# Patient Record
Sex: MALE | URBAN - METROPOLITAN AREA
[De-identification: names, ages, dates, MRNs, and addresses within clinical notes are randomized per-mention and may not be internally consistent; named-entity substitution may affect disease eponyms.]

---

## 2017-10-03 ENCOUNTER — INPATIENT (INPATIENT)
Facility: HOSPITAL | Age: 72
LOS: 2 days | Discharge: HOME | DRG: 871 | End: 2017-10-06
Attending: STUDENT IN AN ORGANIZED HEALTH CARE EDUCATION/TRAINING PROGRAM | Admitting: STUDENT IN AN ORGANIZED HEALTH CARE EDUCATION/TRAINING PROGRAM
Payer: COMMERCIAL

## 2017-10-03 VITALS
OXYGEN SATURATION: 96 % | SYSTOLIC BLOOD PRESSURE: 113 MMHG | TEMPERATURE: 97 F | DIASTOLIC BLOOD PRESSURE: 85 MMHG | HEART RATE: 45 BPM | RESPIRATION RATE: 17 BRPM

## 2017-10-03 DIAGNOSIS — E87.1 HYPO-OSMOLALITY AND HYPONATREMIA: ICD-10-CM

## 2017-10-03 DIAGNOSIS — E87.5 HYPERKALEMIA: ICD-10-CM

## 2017-10-03 DIAGNOSIS — N17.9 ACUTE KIDNEY FAILURE, UNSPECIFIED: ICD-10-CM

## 2017-10-03 DIAGNOSIS — E87.2 ACIDOSIS: ICD-10-CM

## 2017-10-03 LAB
ALBUMIN SERPL ELPH-MCNC: 4 G/DL — SIGNIFICANT CHANGE UP (ref 3.4–5)
ALBUMIN SERPL ELPH-MCNC: 4.3 G/DL — SIGNIFICANT CHANGE UP (ref 3.3–5)
ALP SERPL-CCNC: 86 U/L — SIGNIFICANT CHANGE UP (ref 40–120)
ALP SERPL-CCNC: 86 U/L — SIGNIFICANT CHANGE UP (ref 40–120)
ALT FLD-CCNC: 122 U/L — HIGH (ref 10–45)
ALT FLD-CCNC: 57 U/L — HIGH (ref 12–42)
ANION GAP SERPL CALC-SCNC: 10 MMOL/L — SIGNIFICANT CHANGE UP (ref 9–16)
ANION GAP SERPL CALC-SCNC: 16 MMOL/L — SIGNIFICANT CHANGE UP (ref 5–17)
ANION GAP SERPL CALC-SCNC: 8 MMOL/L — LOW (ref 9–16)
ANION GAP SERPL CALC-SCNC: 9 MMOL/L — SIGNIFICANT CHANGE UP (ref 9–16)
APPEARANCE UR: CLEAR — SIGNIFICANT CHANGE UP
APPEARANCE UR: CLEAR — SIGNIFICANT CHANGE UP
APTT BLD: 25.7 SEC — LOW (ref 27.5–37.4)
APTT BLD: 28.7 SEC — SIGNIFICANT CHANGE UP (ref 27.5–36.5)
AST SERPL-CCNC: 146 U/L — HIGH (ref 10–40)
AST SERPL-CCNC: 55 U/L — HIGH (ref 15–37)
BASOPHILS NFR BLD AUTO: 0.2 % — SIGNIFICANT CHANGE UP (ref 0–2)
BILIRUB SERPL-MCNC: 0.6 MG/DL — SIGNIFICANT CHANGE UP (ref 0.2–1.2)
BILIRUB SERPL-MCNC: 0.7 MG/DL — SIGNIFICANT CHANGE UP (ref 0.2–1.2)
BILIRUB UR-MCNC: (no result)
BILIRUB UR-MCNC: NEGATIVE — SIGNIFICANT CHANGE UP
BLD GP AB SCN SERPL QL: NEGATIVE — SIGNIFICANT CHANGE UP
BUN SERPL-MCNC: 53 MG/DL — HIGH (ref 7–23)
BUN SERPL-MCNC: 55 MG/DL — HIGH (ref 7–23)
BUN SERPL-MCNC: 56 MG/DL — HIGH (ref 7–23)
BUN SERPL-MCNC: 57 MG/DL — HIGH (ref 7–23)
CALCIUM SERPL-MCNC: 8.1 MG/DL — LOW (ref 8.5–10.5)
CALCIUM SERPL-MCNC: 8.5 MG/DL — SIGNIFICANT CHANGE UP (ref 8.5–10.5)
CALCIUM SERPL-MCNC: 8.7 MG/DL — SIGNIFICANT CHANGE UP (ref 8.5–10.5)
CALCIUM SERPL-MCNC: 9.3 MG/DL — SIGNIFICANT CHANGE UP (ref 8.4–10.5)
CHLORIDE SERPL-SCNC: 100 MMOL/L — SIGNIFICANT CHANGE UP (ref 96–108)
CHLORIDE SERPL-SCNC: 94 MMOL/L — LOW (ref 96–108)
CHLORIDE SERPL-SCNC: 98 MMOL/L — SIGNIFICANT CHANGE UP (ref 96–108)
CHLORIDE SERPL-SCNC: 99 MMOL/L — SIGNIFICANT CHANGE UP (ref 96–108)
CHLORIDE UR-SCNC: 31 MMOL/L — SIGNIFICANT CHANGE UP
CK MB BLD-MCNC: 2.8 % — SIGNIFICANT CHANGE UP
CK MB CFR SERPL CALC: 2.6 NG/ML — SIGNIFICANT CHANGE UP (ref 0.5–3.6)
CK MB CFR SERPL CALC: 5.2 NG/ML — SIGNIFICANT CHANGE UP (ref 0–6.7)
CK SERPL-CCNC: 114 U/L — SIGNIFICANT CHANGE UP (ref 30–200)
CO2 SERPL-SCNC: 16 MMOL/L — LOW (ref 22–31)
CO2 SERPL-SCNC: 17 MMOL/L — LOW (ref 22–31)
CO2 SERPL-SCNC: 17 MMOL/L — LOW (ref 22–31)
CO2 SERPL-SCNC: 18 MMOL/L — LOW (ref 22–31)
COLOR SPEC: YELLOW — SIGNIFICANT CHANGE UP
COLOR SPEC: YELLOW — SIGNIFICANT CHANGE UP
CREAT ?TM UR-MCNC: 81 MG/DL — SIGNIFICANT CHANGE UP
CREAT ?TM UR-MCNC: 82 MG/DL — SIGNIFICANT CHANGE UP
CREAT SERPL-MCNC: 2.85 MG/DL — HIGH (ref 0.5–1.3)
CREAT SERPL-MCNC: 3 MG/DL — HIGH (ref 0.5–1.3)
CREAT SERPL-MCNC: 3.03 MG/DL — HIGH (ref 0.5–1.3)
CREAT SERPL-MCNC: 3.05 MG/DL — HIGH (ref 0.5–1.3)
DIFF PNL FLD: NEGATIVE — SIGNIFICANT CHANGE UP
DIFF PNL FLD: NEGATIVE — SIGNIFICANT CHANGE UP
EOSINOPHIL NFR BLD AUTO: 0.1 % — SIGNIFICANT CHANGE UP (ref 0–6)
GAS PNL BLDA: SIGNIFICANT CHANGE UP
GLUCOSE SERPL-MCNC: 174 MG/DL — HIGH (ref 70–99)
GLUCOSE SERPL-MCNC: 214 MG/DL — HIGH (ref 70–99)
GLUCOSE SERPL-MCNC: 228 MG/DL — HIGH (ref 70–99)
GLUCOSE SERPL-MCNC: 311 MG/DL — HIGH (ref 70–99)
GLUCOSE UR QL: NEGATIVE — SIGNIFICANT CHANGE UP
GLUCOSE UR QL: NEGATIVE — SIGNIFICANT CHANGE UP
HCT VFR BLD CALC: 40.1 % — SIGNIFICANT CHANGE UP (ref 39–50)
HCT VFR BLD CALC: 42.1 % — SIGNIFICANT CHANGE UP (ref 39–50)
HGB BLD-MCNC: 13.8 G/DL — SIGNIFICANT CHANGE UP (ref 13–17)
HGB BLD-MCNC: 14.2 G/DL — SIGNIFICANT CHANGE UP (ref 13–17)
IMM GRANULOCYTES NFR BLD AUTO: 0.7 % — SIGNIFICANT CHANGE UP (ref 0–1.5)
INR BLD: 0.96 — SIGNIFICANT CHANGE UP (ref 0.88–1.16)
INR BLD: 0.99 — SIGNIFICANT CHANGE UP (ref 0.88–1.16)
KETONES UR-MCNC: (no result) MG/DL
KETONES UR-MCNC: NEGATIVE — SIGNIFICANT CHANGE UP
LACTATE SERPL-SCNC: 3.4 MMOL/L — HIGH (ref 0.5–2)
LEUKOCYTE ESTERASE UR-ACNC: NEGATIVE — SIGNIFICANT CHANGE UP
LEUKOCYTE ESTERASE UR-ACNC: NEGATIVE — SIGNIFICANT CHANGE UP
LIDOCAIN IGE QN: 430 U/L — HIGH (ref 73–393)
LYMPHOCYTES # BLD AUTO: 9.8 % — LOW (ref 13–44)
MAGNESIUM SERPL-MCNC: 2.3 MG/DL — SIGNIFICANT CHANGE UP (ref 1.6–2.6)
MCHC RBC-ENTMCNC: 31.2 PG — SIGNIFICANT CHANGE UP (ref 27–34)
MCHC RBC-ENTMCNC: 31.3 PG — SIGNIFICANT CHANGE UP (ref 27–34)
MCHC RBC-ENTMCNC: 33.7 G/DL — SIGNIFICANT CHANGE UP (ref 32–36)
MCHC RBC-ENTMCNC: 34.4 G/DL — SIGNIFICANT CHANGE UP (ref 32–36)
MCV RBC AUTO: 90.5 FL — SIGNIFICANT CHANGE UP (ref 80–100)
MCV RBC AUTO: 92.9 FL — SIGNIFICANT CHANGE UP (ref 80–100)
MONOCYTES NFR BLD AUTO: 4.1 % — SIGNIFICANT CHANGE UP (ref 2–14)
NEUTROPHILS NFR BLD AUTO: 85.1 % — HIGH (ref 43–77)
NITRITE UR-MCNC: NEGATIVE — SIGNIFICANT CHANGE UP
NITRITE UR-MCNC: NEGATIVE — SIGNIFICANT CHANGE UP
OSMOLALITY SERPL: 298 MOSM/KG — SIGNIFICANT CHANGE UP (ref 280–301)
OSMOLALITY UR: 348 MOSMOL/KG — SIGNIFICANT CHANGE UP (ref 100–650)
PH UR: 5 — SIGNIFICANT CHANGE UP (ref 5–8)
PH UR: 5.5 — SIGNIFICANT CHANGE UP (ref 5–8)
PHOSPHATE SERPL-MCNC: 4.9 MG/DL — HIGH (ref 2.5–4.5)
PLATELET # BLD AUTO: 171 K/UL — SIGNIFICANT CHANGE UP (ref 150–400)
PLATELET # BLD AUTO: 190 K/UL — SIGNIFICANT CHANGE UP (ref 150–400)
POTASSIUM SERPL-MCNC: 6.8 MMOL/L — CRITICAL HIGH (ref 3.5–5.3)
POTASSIUM SERPL-MCNC: 7.2 MMOL/L — CRITICAL HIGH (ref 3.5–5.3)
POTASSIUM SERPL-MCNC: 8.1 MMOL/L — CRITICAL HIGH (ref 3.5–5.3)
POTASSIUM SERPL-MCNC: 8.3 MMOL/L — CRITICAL HIGH (ref 3.5–5.3)
POTASSIUM SERPL-SCNC: 6.8 MMOL/L — CRITICAL HIGH (ref 3.5–5.3)
POTASSIUM SERPL-SCNC: 7.2 MMOL/L — CRITICAL HIGH (ref 3.5–5.3)
POTASSIUM SERPL-SCNC: 8.1 MMOL/L — CRITICAL HIGH (ref 3.5–5.3)
POTASSIUM SERPL-SCNC: 8.3 MMOL/L — CRITICAL HIGH (ref 3.5–5.3)
POTASSIUM UR-SCNC: 27 MMOL/L — SIGNIFICANT CHANGE UP
PROT ?TM UR-MCNC: 18 MG/DL — HIGH (ref 0–12)
PROT SERPL-MCNC: 7.5 G/DL — SIGNIFICANT CHANGE UP (ref 6–8.3)
PROT SERPL-MCNC: 8.2 G/DL — SIGNIFICANT CHANGE UP (ref 6.4–8.2)
PROT UR-MCNC: 100 MG/DL
PROT UR-MCNC: NEGATIVE MG/DL — SIGNIFICANT CHANGE UP
PROT/CREAT UR-RTO: 0.2 RATIO — SIGNIFICANT CHANGE UP (ref 0–0.2)
PROTHROM AB SERPL-ACNC: 10.5 SEC — SIGNIFICANT CHANGE UP (ref 9.8–12.7)
PROTHROM AB SERPL-ACNC: 11 SEC — SIGNIFICANT CHANGE UP (ref 9.8–12.7)
RBC # BLD: 4.43 M/UL — SIGNIFICANT CHANGE UP (ref 4.2–5.8)
RBC # BLD: 4.53 M/UL — SIGNIFICANT CHANGE UP (ref 4.2–5.8)
RBC # FLD: 14.2 % — SIGNIFICANT CHANGE UP (ref 10.3–16.9)
RBC # FLD: 14.6 % — SIGNIFICANT CHANGE UP (ref 10.3–16.9)
RH IG SCN BLD-IMP: POSITIVE — SIGNIFICANT CHANGE UP
SODIUM SERPL-SCNC: 124 MMOL/L — LOW (ref 132–145)
SODIUM SERPL-SCNC: 125 MMOL/L — LOW (ref 132–145)
SODIUM SERPL-SCNC: 126 MMOL/L — LOW (ref 132–145)
SODIUM SERPL-SCNC: 127 MMOL/L — LOW (ref 135–145)
SODIUM UR-SCNC: 60 MMOL/L — SIGNIFICANT CHANGE UP
SODIUM UR-SCNC: 60 MMOL/L — SIGNIFICANT CHANGE UP
SP GR SPEC: 1.02 — SIGNIFICANT CHANGE UP (ref 1–1.03)
SP GR SPEC: >=1.03 — SIGNIFICANT CHANGE UP (ref 1–1.03)
TROPONIN I SERPL-MCNC: 0.36 NG/ML — HIGH (ref 0.02–0.06)
TROPONIN T SERPL-MCNC: 0.01 NG/ML — SIGNIFICANT CHANGE UP (ref 0–0.01)
UROBILINOGEN FLD QL: 0.2 E.U./DL — SIGNIFICANT CHANGE UP
UROBILINOGEN FLD QL: 0.2 E.U./DL — SIGNIFICANT CHANGE UP
UUN UR-MCNC: 420 MG/DL — SIGNIFICANT CHANGE UP
WBC # BLD: 11.2 K/UL — HIGH (ref 3.8–10.5)
WBC # BLD: 12.3 K/UL — HIGH (ref 3.8–10.5)
WBC # FLD AUTO: 11.2 K/UL — HIGH (ref 3.8–10.5)
WBC # FLD AUTO: 12.3 K/UL — HIGH (ref 3.8–10.5)

## 2017-10-03 PROCEDURE — 93010 ELECTROCARDIOGRAM REPORT: CPT

## 2017-10-03 PROCEDURE — 71010: CPT | Mod: 26

## 2017-10-03 PROCEDURE — 99291 CRITICAL CARE FIRST HOUR: CPT | Mod: 25

## 2017-10-03 RX ORDER — ONDANSETRON 8 MG/1
4 TABLET, FILM COATED ORAL ONCE
Qty: 0 | Refills: 0 | Status: COMPLETED | OUTPATIENT
Start: 2017-10-03 | End: 2017-10-03

## 2017-10-03 RX ORDER — SODIUM CHLORIDE 9 MG/ML
1000 INJECTION INTRAMUSCULAR; INTRAVENOUS; SUBCUTANEOUS ONCE
Qty: 0 | Refills: 0 | Status: COMPLETED | OUTPATIENT
Start: 2017-10-03 | End: 2017-10-03

## 2017-10-03 RX ORDER — GLUCAGON INJECTION, SOLUTION 0.5 MG/.1ML
1 INJECTION, SOLUTION SUBCUTANEOUS ONCE
Qty: 0 | Refills: 0 | Status: DISCONTINUED | OUTPATIENT
Start: 2017-10-03 | End: 2017-10-06

## 2017-10-03 RX ORDER — INSULIN LISPRO 100/ML
VIAL (ML) SUBCUTANEOUS EVERY 4 HOURS
Qty: 0 | Refills: 0 | Status: DISCONTINUED | OUTPATIENT
Start: 2017-10-03 | End: 2017-10-05

## 2017-10-03 RX ORDER — DEXTROSE 50 % IN WATER 50 %
25 SYRINGE (ML) INTRAVENOUS ONCE
Qty: 0 | Refills: 0 | Status: COMPLETED | OUTPATIENT
Start: 2017-10-03 | End: 2017-10-03

## 2017-10-03 RX ORDER — CALCIUM GLUCONATE 100 MG/ML
2 VIAL (ML) INTRAVENOUS ONCE
Qty: 0 | Refills: 0 | Status: COMPLETED | OUTPATIENT
Start: 2017-10-03 | End: 2017-10-03

## 2017-10-03 RX ORDER — SODIUM POLYSTYRENE SULFONATE 4.1 MEQ/G
30 POWDER, FOR SUSPENSION ORAL ONCE
Qty: 0 | Refills: 0 | Status: DISCONTINUED | OUTPATIENT
Start: 2017-10-03 | End: 2017-10-03

## 2017-10-03 RX ORDER — INSULIN HUMAN 100 [IU]/ML
10 INJECTION, SOLUTION SUBCUTANEOUS ONCE
Qty: 0 | Refills: 0 | Status: COMPLETED | OUTPATIENT
Start: 2017-10-03 | End: 2017-10-03

## 2017-10-03 RX ORDER — DEXTROSE 50 % IN WATER 50 %
25 SYRINGE (ML) INTRAVENOUS ONCE
Qty: 0 | Refills: 0 | Status: DISCONTINUED | OUTPATIENT
Start: 2017-10-03 | End: 2017-10-06

## 2017-10-03 RX ORDER — SODIUM POLYSTYRENE SULFONATE 4.1 MEQ/G
15 POWDER, FOR SUSPENSION ORAL ONCE
Qty: 0 | Refills: 0 | Status: COMPLETED | OUTPATIENT
Start: 2017-10-03 | End: 2017-10-03

## 2017-10-03 RX ORDER — SODIUM BICARBONATE 1 MEQ/ML
50 SYRINGE (ML) INTRAVENOUS ONCE
Qty: 0 | Refills: 0 | Status: COMPLETED | OUTPATIENT
Start: 2017-10-03 | End: 2017-10-03

## 2017-10-03 RX ORDER — CALCIUM GLUCONATE 100 MG/ML
1 VIAL (ML) INTRAVENOUS ONCE
Qty: 0 | Refills: 0 | Status: COMPLETED | OUTPATIENT
Start: 2017-10-03 | End: 2017-10-03

## 2017-10-03 RX ORDER — DEXTROSE 50 % IN WATER 50 %
1 SYRINGE (ML) INTRAVENOUS ONCE
Qty: 0 | Refills: 0 | Status: DISCONTINUED | OUTPATIENT
Start: 2017-10-03 | End: 2017-10-06

## 2017-10-03 RX ORDER — SODIUM CHLORIDE 9 MG/ML
1000 INJECTION, SOLUTION INTRAVENOUS
Qty: 0 | Refills: 0 | Status: DISCONTINUED | OUTPATIENT
Start: 2017-10-03 | End: 2017-10-06

## 2017-10-03 RX ORDER — DEXTROSE 50 % IN WATER 50 %
12.5 SYRINGE (ML) INTRAVENOUS ONCE
Qty: 0 | Refills: 0 | Status: DISCONTINUED | OUTPATIENT
Start: 2017-10-03 | End: 2017-10-06

## 2017-10-03 RX ORDER — SODIUM CHLORIDE 9 MG/ML
1000 INJECTION, SOLUTION INTRAVENOUS
Qty: 0 | Refills: 0 | Status: DISCONTINUED | OUTPATIENT
Start: 2017-10-03 | End: 2017-10-04

## 2017-10-03 RX ORDER — ALBUTEROL 90 UG/1
2.5 AEROSOL, METERED ORAL ONCE
Qty: 0 | Refills: 0 | Status: COMPLETED | OUTPATIENT
Start: 2017-10-03 | End: 2017-10-03

## 2017-10-03 RX ORDER — DEXTROSE 50 % IN WATER 50 %
50 SYRINGE (ML) INTRAVENOUS ONCE
Qty: 0 | Refills: 0 | Status: COMPLETED | OUTPATIENT
Start: 2017-10-03 | End: 2017-10-03

## 2017-10-03 RX ADMIN — Medication 50 MILLIEQUIVALENT(S): at 16:46

## 2017-10-03 RX ADMIN — Medication 50 MILLILITER(S): at 16:46

## 2017-10-03 RX ADMIN — Medication 25 MILLILITER(S): at 23:00

## 2017-10-03 RX ADMIN — SODIUM CHLORIDE 4000 MILLILITER(S): 9 INJECTION INTRAMUSCULAR; INTRAVENOUS; SUBCUTANEOUS at 23:00

## 2017-10-03 RX ADMIN — Medication 400 GRAM(S): at 16:47

## 2017-10-03 RX ADMIN — ALBUTEROL 2.5 MILLIGRAM(S): 90 AEROSOL, METERED ORAL at 16:27

## 2017-10-03 RX ADMIN — INSULIN HUMAN 10 UNIT(S): 100 INJECTION, SOLUTION SUBCUTANEOUS at 16:45

## 2017-10-03 RX ADMIN — ONDANSETRON 4 MILLIGRAM(S): 8 TABLET, FILM COATED ORAL at 14:39

## 2017-10-03 RX ADMIN — Medication 200 GRAM(S): at 23:00

## 2017-10-03 RX ADMIN — SODIUM POLYSTYRENE SULFONATE 15 GRAM(S): 4.1 POWDER, FOR SUSPENSION ORAL at 22:55

## 2017-10-03 RX ADMIN — SODIUM POLYSTYRENE SULFONATE 15 GRAM(S): 4.1 POWDER, FOR SUSPENSION ORAL at 16:27

## 2017-10-03 RX ADMIN — SODIUM CHLORIDE 1000 MILLILITER(S): 9 INJECTION INTRAMUSCULAR; INTRAVENOUS; SUBCUTANEOUS at 14:39

## 2017-10-03 RX ADMIN — INSULIN HUMAN 10 UNIT(S): 100 INJECTION, SOLUTION SUBCUTANEOUS at 23:00

## 2017-10-03 NOTE — CONSULT NOTE ADULT - ATTENDING COMMENTS
Patient presented with hyperkalemia and elevated creatinine. Only history of significance is remote history of AICD placed for ASHD.  Patient is unable to give additional history (intubated).  Potassium to be treated with hydration, morales drainage of urine, diuretics, and kayexalate.  With hydration it is anticipated that patient will excrete K.  Creatinine is expected to improve with hydration.   Patient to be monitored closely; if renal function further deteriorates or hyperkalemia is not controlled will arrange for dialysis.

## 2017-10-03 NOTE — CONSULT NOTE ADULT - PROBLEM SELECTOR RECOMMENDATION 4
Patients hyponatremia could be due to hyperosmolar hyponatremia     P - Please check urine sodium and urine osmolality   Monitor labs q4h   Goal corrected sodium at 135

## 2017-10-03 NOTE — CONSULT NOTE ADULT - SUBJECTIVE AND OBJECTIVE BOX
Patient is a 72y Male for whom nephrology was consulted for renal failure and hyperkalemia. Patient presented to the hospital with complaints of lower abdominal pain and dizziness. Patient stated the discomfort started around 7 am. Patient complained of nausea and lightheadedness. Patient denied any fevers, chills, diarrhea, vomiting, or cough. Patient was noted to have an elevated creatinine of 3.0 and hyperkalemic to 8.1 and with non-anion gap metabolic acidosis.     PAST MEDICAL & SURGICAL HISTORY:  Diabetes mellitus  Coronary artery disease  Congestive heart failure s/p pacemaker     MEDICATIONS  (STANDING):  dextrose 5%. 1000 milliLiter(s) (50 mL/Hr) IV Continuous <Continuous>  dextrose 50% Injectable 12.5 Gram(s) IV Push once  dextrose 50% Injectable 25 Gram(s) IV Push once  dextrose 50% Injectable 25 Gram(s) IV Push once  insulin lispro (HumaLOG) corrective regimen sliding scale   SubCutaneous every 4 hours    MEDICATIONS  (PRN):  dextrose Gel 1 Dose(s) Oral once PRN Blood Glucose LESS THAN 70 milliGRAM(s)/deciliter  glucagon  Injectable 1 milliGRAM(s) IntraMuscular once PRN Glucose LESS THAN 70 milligrams/deciliter    Allergies    No Known Allergies    Intolerances    SOCIAL HISTORY:    FAMILY HISTORY:    T(C): , Max: 37.2 (10-03-17 @ 20:02)  T(F): , Max: 98.9 (10-03-17 @ 20:02)  HR: 51 (10-03-17 @ 20:02)  BP: 141/87 (10-03-17 @ 20:02)  BP(mean): 119 (10-03-17 @ 20:02)  RR: 22 (10-03-17 @ 20:02)  SpO2: 98% (10-03-17 @ 20:02)    LABS:                        14.2   12.3  )-----------( 190      ( 03 Oct 2017 14:46 )             42.1     10-03    126<L>  |  100  |  57<H>  ----------------------------<  311<H>  7.2<HH>   |  16<L>  |  3.00<H>    Ca    8.5      03 Oct 2017 18:05    TPro  8.2  /  Alb  4.0  /  TBili  0.7  /  DBili  x   /  AST  55<H>  /  ALT  57<H>  /  AlkPhos  86  10-03    Creatine Kinase, Serum: 93 U/L [39 - 308] (10-03 @ 14:46)    PT/INR - ( 03 Oct 2017 14:46 )   PT: 10.5 sec;   INR: 0.96          PTT - ( 03 Oct 2017 14:46 )  PTT:28.7 sec  Urinalysis Basic - ( 03 Oct 2017 15:52 )    Color: Yellow / Appearance: Clear / SG: >=1.030 / pH: x  Gluc: x / Ketone: Trace mg/dL  / Bili: Small / Urobili: 0.2 E.U./dL   Blood: x / Protein: 100 mg/dL / Nitrite: NEGATIVE   Leuk Esterase: NEGATIVE / RBC: < 5 /HPF / WBC < 5 /HPF   Sq Epi: x / Non Sq Epi: Rare /HPF / Bacteria: Many /HPF Patient is a 72y Male for whom nephrology was consulted for renal failure and hyperkalemia. Patient presented to the hospital with complaints of lower abdominal pain and dizziness. Patient stated the discomfort started around 7 am. Patient complained of nausea and lightheadedness. Patient states that he was [place] way to the airport when he started to feel weak and decided to go to the hospital.  Patient denied any fevers, chills, diarrhea, vomiting, or cough. Patient was noted to have an elevated creatinine of 3.0 and hyperkalemic to 8.1 and with non-anion gap metabolic acidosis.     PAST MEDICAL & SURGICAL HISTORY:  Diabetes mellitus  Coronary artery disease  Congestive heart failure s/p pacemaker     MEDICATIONS  (STANDING):  dextrose 5%. 1000 milliLiter(s) (50 mL/Hr) IV Continuous <Continuous>  dextrose 50% Injectable 12.5 Gram(s) IV Push once  dextrose 50% Injectable 25 Gram(s) IV Push once  dextrose 50% Injectable 25 Gram(s) IV Push once  insulin lispro (HumaLOG) corrective regimen sliding scale   SubCutaneous every 4 hours    MEDICATIONS  (PRN):  dextrose Gel 1 Dose(s) Oral once PRN Blood Glucose LESS THAN 70 milliGRAM(s)/deciliter  glucagon  Injectable 1 milliGRAM(s) IntraMuscular once PRN Glucose LESS THAN 70 milligrams/deciliter    Allergies    No Known Allergies    Intolerances    SOCIAL HISTORY:    FAMILY HISTORY:    T(C): , Max: 37.2 (10-03-17 @ 20:02)  T(F): , Max: 98.9 (10-03-17 @ 20:02)  HR: 51 (10-03-17 @ 20:02)  BP: 141/87 (10-03-17 @ 20:02)  BP(mean): 119 (10-03-17 @ 20:02)  RR: 22 (10-03-17 @ 20:02)  SpO2: 98% (10-03-17 @ 20:02)    LABS:                        14.2   12.3  )-----------( 190      ( 03 Oct 2017 14:46 )             42.1     10-03    126<L>  |  100  |  57<H>  ----------------------------<  311<H>  7.2<HH>   |  16<L>  |  3.00<H>    Ca    8.5      03 Oct 2017 18:05    TPro  8.2  /  Alb  4.0  /  TBili  0.7  /  DBili  x   /  AST  55<H>  /  ALT  57<H>  /  AlkPhos  86  10-03    Creatine Kinase, Serum: 93 U/L [39 - 308] (10-03 @ 14:46)    PT/INR - ( 03 Oct 2017 14:46 )   PT: 10.5 sec;   INR: 0.96          PTT - ( 03 Oct 2017 14:46 )  PTT:28.7 sec  Urinalysis Basic - ( 03 Oct 2017 15:52 )    Color: Yellow / Appearance: Clear / SG: >=1.030 / pH: x  Gluc: x / Ketone: Trace mg/dL  / Bili: Small / Urobili: 0.2 E.U./dL   Blood: x / Protein: 100 mg/dL / Nitrite: NEGATIVE   Leuk Esterase: NEGATIVE / RBC: < 5 /HPF / WBC < 5 /HPF   Sq Epi: x / Non Sq Epi: Rare /HPF / Bacteria: Many /HPF

## 2017-10-03 NOTE — ED PROVIDER NOTE - MEDICAL DECISION MAKING DETAILS
Pt with vague complaints.  Abd exam benign.  Initially ordered CTAp to look for source of sxs but pt declined.  Labs returned and pt in severe ARF with hyperkalemia.  BMP repeated to confirm abnormal value.  Meds given to bring down K+ brought down somewhat but pt will likely need emergent HD.  MICU consulted at [facility] and accept pt for admission.  Renal fellow called at 634pm to be made aware of case.

## 2017-10-03 NOTE — CONSULT NOTE ADULT - PROBLEM SELECTOR RECOMMENDATION 9
-#12 [place] morales placed for strict I's and O's  -f/u u/a  -f/u retrop US  -f/u BMP's
Patient is a 72 year old male with renal failure with unknown baseline. Renal failure could be due to prerenal azotemia. Acute GN is a less likely possibility as it does not fit with the clinical picture. It could be due to obstructive nephropathy. This could also just be chronic kidney disease.     P - would recommend to start patient on D5W with 150 mEq of sodium bicarbonate at 75 cc/hr   Agree with giving 1L fluid challenge   Please send UA, urine lytes, urine protein/creatinine ratio   Please check a retroperitoneal ultrasound to rule out obstruction, evaluate the size and echogenicity of the kidneys to evaluate chronicity of the renal failure   Please monitor strict I/Os   no need for emergent dialysis at this time as patient seems to be responding to medical therapy   However, it has been discussed with the patient that if his electrolytes worsen and should he deteriorate he may require hemodialysis. He agrees to dialysis if it is necessary   Please avoid nephrotoxic medications, IV contrast, and NSAIDs

## 2017-10-03 NOTE — ED PROVIDER NOTE - CARE PLAN
Principal Discharge DX:	Acute renal failure, unspecified acute renal failure type  Secondary Diagnosis:	Hyperkalemia

## 2017-10-03 NOTE — CONSULT NOTE ADULT - PROBLEM SELECTOR RECOMMENDATION 2
Patient's hyperkalemia is likely related to renal failure along with the use of ARBs. Patient is responding to medical therapy and potassium has improved from 8.1 to 6.8.     P - would continue with medical management   Start sodium bicarbonate drip at 75 cc/hr   please give kayexelate 15 gm PO   please give insulin and dextrose   repeat EKG and if there are changes, please give calcium gluconate  repeat labs q6h   No need for emergent dialysis at this time as patient is responding to medical therapy

## 2017-10-03 NOTE — CONSULT NOTE ADULT - ASSESSMENT
71 yo male with ARF, hyperkalemia; difficult morales placement
Patient is a 72 year old male with a history of coronary artery disease, hypertension, and diabetes mellitus whom prseented to the hospital with renal failure and hyperkalemia.

## 2017-10-03 NOTE — CONSULT NOTE ADULT - PROBLEM SELECTOR RECOMMENDATION 3
Patient has non-anion gap metabolic acidosis in the setting of renal failure.   Please start bicarbonate drip at 75 cc/hr   Monitor electrolytes

## 2017-10-03 NOTE — ED ADULT NURSE NOTE - OBJECTIVE STATEMENT
Patient is a 73 y/o male presents to the ED s/p dizziness. Patient reports feeling BP drop, slight generalized abdominal discomfort, and bloating. Patient denies fever/chills, weakness, urinary/bowel s/s, N/V/D/C. Patient in NAD, patient clinically upgraded, and will continue to monitor. Patient has pacemaker to left chest.

## 2017-10-03 NOTE — ED PROVIDER NOTE - OBJECTIVE STATEMENT
73 y/o Male with a hx of DM and has a defibrillator/pacemaker BIBA for lower abdominal pain. Pt states nausea and pain started today at 7am. Denies CP, dizziness, fever, chills, diarrhea, vomiting, and cough. Came from [place] on 9/29/17 and will return on 10/4/17. 73 y/o Male with a hx of DM, CAD, CHF, (has defibrillator/pacemaker) BIBA for lower abdominal pain and dizziness. Pt states nausea and discomfort started today at 7am. Associated with light headed sensation and generalized weakness/fatigue.  Denies CP, fever, chills, diarrhea, vomiting, and cough. Came from [place] on 9/29/17 and will return on 10/4/17.    Patient interviewed with [place] 71 y/o Male with a hx of DM, CAD, CHF, (has defibrillator/pacemaker) BIBA for lower abdominal pain and dizziness. Pt states nausea and discomfort started today at 7am. Associated with light headed sensation and generalized weakness/fatigue.  Denies CP, fever, chills, diarrhea, vomiting, and cough. Came from [place] on 9/29/17 and will return on 10/4/17.  Pt reports he wants to have it "checked out" before departing on a long plane flight.      Patient interviewed with Spanish .

## 2017-10-03 NOTE — CONSULT NOTE ADULT - PROBLEM SELECTOR PROBLEM 1
Acute renal failure, unspecified acute renal failure type
Acute renal failure, unspecified acute renal failure type

## 2017-10-03 NOTE — CONSULT NOTE ADULT - RS GEN PE MLT RESP DETAILS PC
normal/good air movement/airway patent/clear to auscultation bilaterally/respirations non-labored/breath sounds equal

## 2017-10-03 NOTE — ED PROVIDER NOTE - CRITICAL CARE PROVIDED
additional history taking/consultation with other physicians/documentation/direct patient care (not related to procedure)/interpretation of diagnostic studies

## 2017-10-03 NOTE — CONSULT NOTE ADULT - SUBJECTIVE AND OBJECTIVE BOX
Patient is a 72y Male with h/o DM, CAD, htn, chf, admitted  for renal failure and hyperkalemia. Patient presented to the hospital with complaints of lower abdominal pain and dizziness. Patient stated the discomfort started around 7 am. Patient complained of nausea and lightheadedness. Patient denied any fevers, chills, diarrhea, vomiting, or cough. Patient was noted to have an elevated creatinine of 3.0 and hyperkalemic to 8.1 and with non-anion gap metabolic acidosis. Difficulty placing morales. Called for evaluation. Patient denies any urinary issues in past. No dysuria/hematuria. No frequency, urgency.       PAST MEDICAL & SURGICAL HISTORY: dm, cad, chf, ppm      MEDICATIONS  (STANDING):  dextrose 5%. 1000 milliLiter(s) (50 mL/Hr) IV Continuous <Continuous>  dextrose 50% Injectable 12.5 Gram(s) IV Push once  dextrose 50% Injectable 25 Gram(s) IV Push once  dextrose 50% Injectable 25 Gram(s) IV Push once  insulin lispro (HumaLOG) corrective regimen sliding scale   SubCutaneous every 4 hours    MEDICATIONS  (PRN):  dextrose Gel 1 Dose(s) Oral once PRN Blood Glucose LESS THAN 70 milliGRAM(s)/deciliter  glucagon  Injectable 1 milliGRAM(s) IntraMuscular once PRN Glucose LESS THAN 70 milligrams/deciliter      Allergies    No Known Allergies    Intolerances        SOCIAL HISTORY:    FAMILY HISTORY:      Vital Signs Last 24 Hrs  T(C): 37.2 (03 Oct 2017 20:02), Max: 37.2 (03 Oct 2017 20:02)  T(F): 98.9 (03 Oct 2017 20:02), Max: 98.9 (03 Oct 2017 20:02)  HR: 44 (03 Oct 2017 21:00) (44 - 60)  BP: 145/89 (03 Oct 2017 21:00) (113/85 - 175/93)  BP(mean): 111 (03 Oct 2017 21:00) (111 - 119)  RR: 23 (03 Oct 2017 21:00) (16 - 23)  SpO2: 100% (03 Oct 2017 21:00) (95% - 100%)    On PE:  General: alert and awake  Abdomen: soft, NT, ND  : no masses palpable, testes descended bilat  EXT: no edema    LABS:                        13.8   11.2  )-----------( 171      ( 03 Oct 2017 20:58 )             40.1     10-03    127<L>  |  94<L>  |  53<H>  ----------------------------<  174<H>  6.8<HH>   |  17<L>  |  2.85<H>    Ca    9.3      03 Oct 2017 20:58  Phos  4.9     10-03  Mg     2.3     10-03    TPro  7.5  /  Alb  4.3  /  TBili  0.6  /  DBili  x   /  AST  146<H>  /  ALT  122<H>  /  AlkPhos  86  10-03    PT/INR - ( 03 Oct 2017 20:58 )   PT: 11.0 sec;   INR: 0.99          PTT - ( 03 Oct 2017 20:58 )  PTT:25.7 sec  Urinalysis Basic - ( 03 Oct 2017 15:52 )    Color: Yellow / Appearance: Clear / SG: >=1.030 / pH: x  Gluc: x / Ketone: Trace mg/dL  / Bili: Small / Urobili: 0.2 E.U./dL   Blood: x / Protein: 100 mg/dL / Nitrite: NEGATIVE   Leuk Esterase: NEGATIVE / RBC: < 5 /HPF / WBC < 5 /HPF   Sq Epi: x / Non Sq Epi: Rare /HPF / Bacteria: Many /HPF        RADIOLOGY & ADDITIONAL STUDIES:

## 2017-10-03 NOTE — ED ADULT NURSE REASSESSMENT NOTE - NS ED NURSE REASSESS COMMENT FT1
Tried to place morales catheter. Unsuccessful. When trying to advance catheter, felt blockage and patient was experiencing too much pain. MD Martínez notified, was told he did not want treatment delayed for patient. MEGHA Fagan from [facility] notified of unsuccessful morales insertion. Patient in NAD and will continue to monitor.

## 2017-10-03 NOTE — ED PROVIDER NOTE - PROGRESS NOTE DETAILS
Used  Alfreda [id number], informed patient of potasium finding of 8.1 and advised him to be admitted if repeat blood work continues to show abnormalities. Pt kept saying "I'm fine, I just want medication to bring my BP back up to normal, [facility] [place] [facility] Pt is also declining the CAT scan of the abdomen. Pt also reports to feeling better after zofran Used : [id number], informed patient of new lab findings and explained extensively about dangerous findings. Pt agreed to stay and be admitted. Will call transfer center.

## 2017-10-04 LAB
ALBUMIN SERPL ELPH-MCNC: 3.2 G/DL — LOW (ref 3.3–5)
ALBUMIN SERPL ELPH-MCNC: 3.4 G/DL — SIGNIFICANT CHANGE UP (ref 3.3–5)
ALBUMIN SERPL ELPH-MCNC: 4.1 G/DL — SIGNIFICANT CHANGE UP (ref 3.3–5)
ALP SERPL-CCNC: 71 U/L — SIGNIFICANT CHANGE UP (ref 40–120)
ALP SERPL-CCNC: 76 U/L — SIGNIFICANT CHANGE UP (ref 40–120)
ALP SERPL-CCNC: 83 U/L — SIGNIFICANT CHANGE UP (ref 40–120)
ALT FLD-CCNC: 114 U/L — HIGH (ref 10–45)
ALT FLD-CCNC: 76 U/L — HIGH (ref 10–45)
ALT FLD-CCNC: 89 U/L — HIGH (ref 10–45)
ANION GAP SERPL CALC-SCNC: 13 MMOL/L — SIGNIFICANT CHANGE UP (ref 5–17)
ANION GAP SERPL CALC-SCNC: 13 MMOL/L — SIGNIFICANT CHANGE UP (ref 5–17)
ANION GAP SERPL CALC-SCNC: 18 MMOL/L — HIGH (ref 5–17)
ANION GAP SERPL CALC-SCNC: 18 MMOL/L — HIGH (ref 5–17)
AST SERPL-CCNC: 110 U/L — HIGH (ref 10–40)
AST SERPL-CCNC: 45 U/L — HIGH (ref 10–40)
AST SERPL-CCNC: 70 U/L — HIGH (ref 10–40)
BASE EXCESS BLDA CALC-SCNC: -2.2 MMOL/L — LOW (ref -2–3)
BASE EXCESS BLDA CALC-SCNC: 0.8 MMOL/L — SIGNIFICANT CHANGE UP (ref -2–3)
BILIRUB SERPL-MCNC: 0.4 MG/DL — SIGNIFICANT CHANGE UP (ref 0.2–1.2)
BILIRUB SERPL-MCNC: 0.4 MG/DL — SIGNIFICANT CHANGE UP (ref 0.2–1.2)
BILIRUB SERPL-MCNC: 0.6 MG/DL — SIGNIFICANT CHANGE UP (ref 0.2–1.2)
BUN SERPL-MCNC: 42 MG/DL — HIGH (ref 7–23)
BUN SERPL-MCNC: 46 MG/DL — HIGH (ref 7–23)
BUN SERPL-MCNC: 50 MG/DL — HIGH (ref 7–23)
BUN SERPL-MCNC: 51 MG/DL — HIGH (ref 7–23)
CALCIUM SERPL-MCNC: 7.9 MG/DL — LOW (ref 8.4–10.5)
CALCIUM SERPL-MCNC: 8.2 MG/DL — LOW (ref 8.4–10.5)
CALCIUM SERPL-MCNC: 8.6 MG/DL — SIGNIFICANT CHANGE UP (ref 8.4–10.5)
CALCIUM SERPL-MCNC: 9.3 MG/DL — SIGNIFICANT CHANGE UP (ref 8.4–10.5)
CHLORIDE SERPL-SCNC: 94 MMOL/L — LOW (ref 96–108)
CHLORIDE SERPL-SCNC: 94 MMOL/L — LOW (ref 96–108)
CHLORIDE SERPL-SCNC: 95 MMOL/L — LOW (ref 96–108)
CHLORIDE SERPL-SCNC: 95 MMOL/L — LOW (ref 96–108)
CK MB CFR SERPL CALC: 5.3 NG/ML — SIGNIFICANT CHANGE UP (ref 0–6.7)
CK SERPL-CCNC: 127 U/L — SIGNIFICANT CHANGE UP (ref 30–200)
CO2 SERPL-SCNC: 15 MMOL/L — LOW (ref 22–31)
CO2 SERPL-SCNC: 17 MMOL/L — LOW (ref 22–31)
CO2 SERPL-SCNC: 22 MMOL/L — SIGNIFICANT CHANGE UP (ref 22–31)
CO2 SERPL-SCNC: 25 MMOL/L — SIGNIFICANT CHANGE UP (ref 22–31)
CREAT SERPL-MCNC: 2.2 MG/DL — HIGH (ref 0.5–1.3)
CREAT SERPL-MCNC: 2.29 MG/DL — HIGH (ref 0.5–1.3)
CREAT SERPL-MCNC: 2.49 MG/DL — HIGH (ref 0.5–1.3)
CREAT SERPL-MCNC: 2.69 MG/DL — HIGH (ref 0.5–1.3)
GAS PNL BLDA: SIGNIFICANT CHANGE UP
GAS PNL BLDV: SIGNIFICANT CHANGE UP
GGT SERPL-CCNC: 284 U/L — HIGH (ref 9–50)
GLUCOSE SERPL-MCNC: 174 MG/DL — HIGH (ref 70–99)
GLUCOSE SERPL-MCNC: 181 MG/DL — HIGH (ref 70–99)
GLUCOSE SERPL-MCNC: 214 MG/DL — HIGH (ref 70–99)
GLUCOSE SERPL-MCNC: 228 MG/DL — HIGH (ref 70–99)
HBA1C BLD-MCNC: 7 % — HIGH (ref 4–5.6)
HCO3 BLDA-SCNC: 22 MMOL/L — SIGNIFICANT CHANGE UP (ref 21–28)
HCO3 BLDA-SCNC: 25 MMOL/L — SIGNIFICANT CHANGE UP (ref 21–28)
HCT VFR BLD CALC: 41.4 % — SIGNIFICANT CHANGE UP (ref 39–50)
HGB BLD-MCNC: 14.4 G/DL — SIGNIFICANT CHANGE UP (ref 13–17)
LACTATE SERPL-SCNC: 2 MMOL/L — SIGNIFICANT CHANGE UP (ref 0.5–2)
LACTATE SERPL-SCNC: 2.3 MMOL/L — HIGH (ref 0.5–2)
LACTATE SERPL-SCNC: 2.9 MMOL/L — HIGH (ref 0.5–2)
MAGNESIUM SERPL-MCNC: 2 MG/DL — SIGNIFICANT CHANGE UP (ref 1.6–2.6)
MAGNESIUM SERPL-MCNC: 2.2 MG/DL — SIGNIFICANT CHANGE UP (ref 1.6–2.6)
MCHC RBC-ENTMCNC: 31.6 PG — SIGNIFICANT CHANGE UP (ref 27–34)
MCHC RBC-ENTMCNC: 34.8 G/DL — SIGNIFICANT CHANGE UP (ref 32–36)
MCV RBC AUTO: 90.8 FL — SIGNIFICANT CHANGE UP (ref 80–100)
NT-PROBNP SERPL-SCNC: 956 PG/ML — HIGH (ref 0–300)
PCO2 BLDA: 34 MMHG — LOW (ref 35–48)
PCO2 BLDA: 37 MMHG — SIGNIFICANT CHANGE UP (ref 35–48)
PH BLDA: 7.42 — SIGNIFICANT CHANGE UP (ref 7.35–7.45)
PH BLDA: 7.44 — SIGNIFICANT CHANGE UP (ref 7.35–7.45)
PHOSPHATE SERPL-MCNC: 4.2 MG/DL — SIGNIFICANT CHANGE UP (ref 2.5–4.5)
PHOSPHATE SERPL-MCNC: 4.5 MG/DL — SIGNIFICANT CHANGE UP (ref 2.5–4.5)
PLATELET # BLD AUTO: 172 K/UL — SIGNIFICANT CHANGE UP (ref 150–400)
PO2 BLDA: 102 MMHG — SIGNIFICANT CHANGE UP (ref 83–108)
PO2 BLDA: 87 MMHG — SIGNIFICANT CHANGE UP (ref 83–108)
POTASSIUM SERPL-MCNC: 4.3 MMOL/L — SIGNIFICANT CHANGE UP (ref 3.5–5.3)
POTASSIUM SERPL-MCNC: 4.4 MMOL/L — SIGNIFICANT CHANGE UP (ref 3.5–5.3)
POTASSIUM SERPL-MCNC: 6.3 MMOL/L — CRITICAL HIGH (ref 3.5–5.3)
POTASSIUM SERPL-MCNC: 6.6 MMOL/L — CRITICAL HIGH (ref 3.5–5.3)
POTASSIUM SERPL-SCNC: 4.3 MMOL/L — SIGNIFICANT CHANGE UP (ref 3.5–5.3)
POTASSIUM SERPL-SCNC: 4.4 MMOL/L — SIGNIFICANT CHANGE UP (ref 3.5–5.3)
POTASSIUM SERPL-SCNC: 6.3 MMOL/L — CRITICAL HIGH (ref 3.5–5.3)
POTASSIUM SERPL-SCNC: 6.6 MMOL/L — CRITICAL HIGH (ref 3.5–5.3)
PROT SERPL-MCNC: 5.9 G/DL — LOW (ref 6–8.3)
PROT SERPL-MCNC: 6.3 G/DL — SIGNIFICANT CHANGE UP (ref 6–8.3)
PROT SERPL-MCNC: 7.2 G/DL — SIGNIFICANT CHANGE UP (ref 6–8.3)
RBC # BLD: 4.56 M/UL — SIGNIFICANT CHANGE UP (ref 4.2–5.8)
RBC # FLD: 14.7 % — SIGNIFICANT CHANGE UP (ref 10.3–16.9)
SAO2 % BLDA: 97 % — SIGNIFICANT CHANGE UP (ref 95–100)
SAO2 % BLDA: 98 % — SIGNIFICANT CHANGE UP (ref 95–100)
SODIUM SERPL-SCNC: 127 MMOL/L — LOW (ref 135–145)
SODIUM SERPL-SCNC: 129 MMOL/L — LOW (ref 135–145)
SODIUM SERPL-SCNC: 130 MMOL/L — LOW (ref 135–145)
SODIUM SERPL-SCNC: 133 MMOL/L — LOW (ref 135–145)
TROPONIN T SERPL-MCNC: 0.02 NG/ML — HIGH (ref 0–0.01)
URATE SERPL-MCNC: 5.8 MG/DL — SIGNIFICANT CHANGE UP (ref 3.4–8.8)
WBC # BLD: 11.6 K/UL — HIGH (ref 3.8–10.5)
WBC # FLD AUTO: 11.6 K/UL — HIGH (ref 3.8–10.5)

## 2017-10-04 PROCEDURE — 99233 SBSQ HOSP IP/OBS HIGH 50: CPT | Mod: GC

## 2017-10-04 PROCEDURE — 74000: CPT | Mod: 26

## 2017-10-04 PROCEDURE — 74176 CT ABD & PELVIS W/O CONTRAST: CPT | Mod: 26

## 2017-10-04 PROCEDURE — 71250 CT THORAX DX C-: CPT | Mod: 26

## 2017-10-04 PROCEDURE — 36620 INSERTION CATHETER ARTERY: CPT | Mod: GC

## 2017-10-04 PROCEDURE — 99291 CRITICAL CARE FIRST HOUR: CPT | Mod: 25

## 2017-10-04 PROCEDURE — 93306 TTE W/DOPPLER COMPLETE: CPT | Mod: 26

## 2017-10-04 PROCEDURE — 71010: CPT | Mod: 26

## 2017-10-04 RX ORDER — AZITHROMYCIN 500 MG/1
500 TABLET, FILM COATED ORAL EVERY 24 HOURS
Qty: 0 | Refills: 0 | Status: DISCONTINUED | OUTPATIENT
Start: 2017-10-04 | End: 2017-10-04

## 2017-10-04 RX ORDER — DEXCHLORPHENIRAMINE MALEATE 6 MG
0 TABLET, EXTENDED RELEASE ORAL
Qty: 0 | Refills: 0 | COMMUNITY

## 2017-10-04 RX ORDER — VANCOMYCIN HCL 1 G
1000 VIAL (EA) INTRAVENOUS ONCE
Qty: 0 | Refills: 0 | Status: COMPLETED | OUTPATIENT
Start: 2017-10-04 | End: 2017-10-04

## 2017-10-04 RX ORDER — IPRATROPIUM/ALBUTEROL SULFATE 18-103MCG
3 AEROSOL WITH ADAPTER (GRAM) INHALATION EVERY 4 HOURS
Qty: 0 | Refills: 0 | Status: DISCONTINUED | OUTPATIENT
Start: 2017-10-04 | End: 2017-10-06

## 2017-10-04 RX ORDER — PROPOFOL 10 MG/ML
5 INJECTION, EMULSION INTRAVENOUS
Qty: 1000 | Refills: 0 | Status: DISCONTINUED | OUTPATIENT
Start: 2017-10-04 | End: 2017-10-05

## 2017-10-04 RX ORDER — PIPERACILLIN AND TAZOBACTAM 4; .5 G/20ML; G/20ML
INJECTION, POWDER, LYOPHILIZED, FOR SOLUTION INTRAVENOUS
Qty: 0 | Refills: 0 | Status: DISCONTINUED | OUTPATIENT
Start: 2017-10-04 | End: 2017-10-04

## 2017-10-04 RX ORDER — SODIUM CHLORIDE 9 MG/ML
1000 INJECTION INTRAMUSCULAR; INTRAVENOUS; SUBCUTANEOUS
Qty: 0 | Refills: 0 | Status: DISCONTINUED | OUTPATIENT
Start: 2017-10-04 | End: 2017-10-04

## 2017-10-04 RX ORDER — INSULIN HUMAN 100 [IU]/ML
10 INJECTION, SOLUTION SUBCUTANEOUS ONCE
Qty: 0 | Refills: 0 | Status: DISCONTINUED | OUTPATIENT
Start: 2017-10-04 | End: 2017-10-04

## 2017-10-04 RX ORDER — SODIUM POLYSTYRENE SULFONATE 4.1 MEQ/G
15 POWDER, FOR SUSPENSION ORAL ONCE
Qty: 0 | Refills: 0 | Status: COMPLETED | OUTPATIENT
Start: 2017-10-04 | End: 2017-10-04

## 2017-10-04 RX ORDER — AMPICILLIN SODIUM AND SULBACTAM SODIUM 250; 125 MG/ML; MG/ML
3 INJECTION, POWDER, FOR SUSPENSION INTRAMUSCULAR; INTRAVENOUS EVERY 12 HOURS
Qty: 0 | Refills: 0 | Status: DISCONTINUED | OUTPATIENT
Start: 2017-10-04 | End: 2017-10-04

## 2017-10-04 RX ORDER — CHLORHEXIDINE GLUCONATE 213 G/1000ML
15 SOLUTION TOPICAL
Qty: 0 | Refills: 0 | Status: DISCONTINUED | OUTPATIENT
Start: 2017-10-04 | End: 2017-10-05

## 2017-10-04 RX ORDER — ATORVASTATIN CALCIUM 80 MG/1
1 TABLET, FILM COATED ORAL
Qty: 0 | Refills: 0 | COMMUNITY

## 2017-10-04 RX ORDER — PIPERACILLIN AND TAZOBACTAM 4; .5 G/20ML; G/20ML
3.38 INJECTION, POWDER, LYOPHILIZED, FOR SOLUTION INTRAVENOUS ONCE
Qty: 0 | Refills: 0 | Status: COMPLETED | OUTPATIENT
Start: 2017-10-04 | End: 2017-10-04

## 2017-10-04 RX ORDER — HEPARIN SODIUM 5000 [USP'U]/ML
5000 INJECTION INTRAVENOUS; SUBCUTANEOUS EVERY 8 HOURS
Qty: 0 | Refills: 0 | Status: DISCONTINUED | OUTPATIENT
Start: 2017-10-04 | End: 2017-10-06

## 2017-10-04 RX ORDER — NOREPINEPHRINE BITARTRATE/D5W 8 MG/250ML
0.01 PLASTIC BAG, INJECTION (ML) INTRAVENOUS
Qty: 8 | Refills: 0 | Status: DISCONTINUED | OUTPATIENT
Start: 2017-10-04 | End: 2017-10-04

## 2017-10-04 RX ORDER — SODIUM BICARBONATE 1 MEQ/ML
50 SYRINGE (ML) INTRAVENOUS ONCE
Qty: 0 | Refills: 0 | Status: COMPLETED | OUTPATIENT
Start: 2017-10-04 | End: 2017-10-04

## 2017-10-04 RX ORDER — PIPERACILLIN AND TAZOBACTAM 4; .5 G/20ML; G/20ML
3.38 INJECTION, POWDER, LYOPHILIZED, FOR SOLUTION INTRAVENOUS EVERY 6 HOURS
Qty: 0 | Refills: 0 | Status: DISCONTINUED | OUTPATIENT
Start: 2017-10-04 | End: 2017-10-04

## 2017-10-04 RX ORDER — PANTOPRAZOLE SODIUM 20 MG/1
40 TABLET, DELAYED RELEASE ORAL DAILY
Qty: 0 | Refills: 0 | Status: DISCONTINUED | OUTPATIENT
Start: 2017-10-04 | End: 2017-10-06

## 2017-10-04 RX ORDER — VANCOMYCIN HCL 1 G
VIAL (EA) INTRAVENOUS
Qty: 0 | Refills: 0 | Status: DISCONTINUED | OUTPATIENT
Start: 2017-10-04 | End: 2017-10-04

## 2017-10-04 RX ORDER — PROPOFOL 10 MG/ML
5 INJECTION, EMULSION INTRAVENOUS
Qty: 1000 | Refills: 0 | Status: DISCONTINUED | OUTPATIENT
Start: 2017-10-04 | End: 2017-10-04

## 2017-10-04 RX ORDER — DEXTROSE 50 % IN WATER 50 %
25 SYRINGE (ML) INTRAVENOUS ONCE
Qty: 0 | Refills: 0 | Status: COMPLETED | OUTPATIENT
Start: 2017-10-04 | End: 2017-10-04

## 2017-10-04 RX ORDER — CEFTRIAXONE 500 MG/1
1 INJECTION, POWDER, FOR SOLUTION INTRAMUSCULAR; INTRAVENOUS EVERY 24 HOURS
Qty: 0 | Refills: 0 | Status: DISCONTINUED | OUTPATIENT
Start: 2017-10-04 | End: 2017-10-06

## 2017-10-04 RX ORDER — SODIUM CHLORIDE 9 MG/ML
1000 INJECTION INTRAMUSCULAR; INTRAVENOUS; SUBCUTANEOUS
Qty: 0 | Refills: 0 | Status: DISCONTINUED | OUTPATIENT
Start: 2017-10-04 | End: 2017-10-05

## 2017-10-04 RX ORDER — INSULIN HUMAN 100 [IU]/ML
10 INJECTION, SOLUTION SUBCUTANEOUS ONCE
Qty: 0 | Refills: 0 | Status: COMPLETED | OUTPATIENT
Start: 2017-10-04 | End: 2017-10-04

## 2017-10-04 RX ADMIN — PROPOFOL 2.34 MICROGRAM(S)/KG/MIN: 10 INJECTION, EMULSION INTRAVENOUS at 17:39

## 2017-10-04 RX ADMIN — HEPARIN SODIUM 5000 UNIT(S): 5000 INJECTION INTRAVENOUS; SUBCUTANEOUS at 07:05

## 2017-10-04 RX ADMIN — PANTOPRAZOLE SODIUM 40 MILLIGRAM(S): 20 TABLET, DELAYED RELEASE ORAL at 11:34

## 2017-10-04 RX ADMIN — Medication 1 DROP(S): at 11:33

## 2017-10-04 RX ADMIN — PROPOFOL 2.34 MICROGRAM(S)/KG/MIN: 10 INJECTION, EMULSION INTRAVENOUS at 12:22

## 2017-10-04 RX ADMIN — HEPARIN SODIUM 5000 UNIT(S): 5000 INJECTION INTRAVENOUS; SUBCUTANEOUS at 22:56

## 2017-10-04 RX ADMIN — SODIUM POLYSTYRENE SULFONATE 15 GRAM(S): 4.1 POWDER, FOR SUSPENSION ORAL at 02:11

## 2017-10-04 RX ADMIN — Medication 1.46 MICROGRAM(S)/KG/MIN: at 06:30

## 2017-10-04 RX ADMIN — Medication 3 MILLILITER(S): at 05:58

## 2017-10-04 RX ADMIN — Medication 50 MILLIEQUIVALENT(S): at 01:49

## 2017-10-04 RX ADMIN — INSULIN HUMAN 10 UNIT(S): 100 INJECTION, SOLUTION SUBCUTANEOUS at 07:05

## 2017-10-04 RX ADMIN — AMPICILLIN SODIUM AND SULBACTAM SODIUM 200 GRAM(S): 250; 125 INJECTION, POWDER, FOR SUSPENSION INTRAMUSCULAR; INTRAVENOUS at 02:12

## 2017-10-04 RX ADMIN — HEPARIN SODIUM 5000 UNIT(S): 5000 INJECTION INTRAVENOUS; SUBCUTANEOUS at 14:52

## 2017-10-04 RX ADMIN — Medication 1 DROP(S): at 07:21

## 2017-10-04 RX ADMIN — Medication 1 DROP(S): at 17:37

## 2017-10-04 RX ADMIN — Medication 3 MILLILITER(S): at 18:20

## 2017-10-04 RX ADMIN — Medication 2: at 02:59

## 2017-10-04 RX ADMIN — PROPOFOL 2.34 MICROGRAM(S)/KG/MIN: 10 INJECTION, EMULSION INTRAVENOUS at 07:13

## 2017-10-04 RX ADMIN — PROPOFOL 2.34 MICROGRAM(S)/KG/MIN: 10 INJECTION, EMULSION INTRAVENOUS at 00:45

## 2017-10-04 RX ADMIN — Medication 250 MILLIGRAM(S): at 05:15

## 2017-10-04 RX ADMIN — Medication 3 MILLILITER(S): at 22:32

## 2017-10-04 RX ADMIN — SODIUM CHLORIDE 75 MILLILITER(S): 9 INJECTION, SOLUTION INTRAVENOUS at 00:25

## 2017-10-04 RX ADMIN — Medication 110 MILLIGRAM(S): at 22:56

## 2017-10-04 RX ADMIN — Medication 25 MILLILITER(S): at 07:05

## 2017-10-04 RX ADMIN — Medication 110 MILLIGRAM(S): at 11:32

## 2017-10-04 RX ADMIN — Medication 6: at 07:05

## 2017-10-04 RX ADMIN — PROPOFOL 2.34 MICROGRAM(S)/KG/MIN: 10 INJECTION, EMULSION INTRAVENOUS at 02:41

## 2017-10-04 RX ADMIN — CHLORHEXIDINE GLUCONATE 15 MILLILITER(S): 213 SOLUTION TOPICAL at 07:05

## 2017-10-04 RX ADMIN — CHLORHEXIDINE GLUCONATE 15 MILLILITER(S): 213 SOLUTION TOPICAL at 17:39

## 2017-10-04 RX ADMIN — Medication 1 DROP(S): at 22:58

## 2017-10-04 RX ADMIN — CEFTRIAXONE 100 GRAM(S): 500 INJECTION, POWDER, FOR SOLUTION INTRAMUSCULAR; INTRAVENOUS at 11:34

## 2017-10-04 RX ADMIN — Medication 3 MILLILITER(S): at 09:18

## 2017-10-04 RX ADMIN — Medication 3 MILLILITER(S): at 13:46

## 2017-10-04 RX ADMIN — PIPERACILLIN AND TAZOBACTAM 200 GRAM(S): 4; .5 INJECTION, POWDER, LYOPHILIZED, FOR SOLUTION INTRAVENOUS at 05:15

## 2017-10-04 RX ADMIN — SODIUM CHLORIDE 100 MILLILITER(S): 9 INJECTION INTRAMUSCULAR; INTRAVENOUS; SUBCUTANEOUS at 05:30

## 2017-10-04 NOTE — PROGRESS NOTE ADULT - SUBJECTIVE AND OBJECTIVE BOX
INTERVAL HPI/OVERNIGHT EVENTS:  Admitted o/n - presented with lower abdominal pain, nausea, and dizziness - found to have AGMA and K+ 8.3, now s/p Kayexalate, Insulin, and Calcium Gluconate.  Femoral Arterial Line inserted with likely need for central line.    At around midnight, patient complained of chest pain, SOB, and was diaphoretic and plethoric. He became hypoxic to the 80s on non-rebreather and decision was made  to intubate patient.    VITAL SIGNS:  T(F): 98.9 (10-04-17 @ 12:00)  HR: 62 (10-04-17 @ 13:00)  BP: 112/67 (10-04-17 @ 09:00)  RR: 16 (10-04-17 @ 13:00)  SpO2: 97% (10-04-17 @ 13:00)  Wt(kg): --    Input & Output:  10-03-17 @ 07:01  -  10-04-17 @ 07:00  --------------------------------------------------------  IN: 2287.7 mL / OUT: 715 mL / NET: 1572.7 mL    10-04-17 @ 07:01  -  10-04-17 @ 13:18  --------------------------------------------------------  IN: 540.2 mL / OUT: 960 mL / NET: -419.8 mL       Physical Exam:  	General: patient is intubated and sedated   	Head: NC/AT  	ENT: ET tube in place, no nasal discharge; uvula midline, no oropharyngeal erythema or exudates; MMM  	Lungs: good air movement upper lung fields, +bibasilar rales, synchornous breathing with ventilator    	Cardio: bradycardic, +S1S2,   	GI: distended, +BSx4, unable to assess tenderness  	Extremities: trace upper extremity peripheral edema, no lower peripheral edema, no cyanosis, no clubbing   	Vascular: weak radial and femoral pulses   	Dermatologic: skin warm, dry and intact; no rashes, wounds, or scars  	Lymphatic: no submandibular or cervical LAD    Neurologic: AAOx0    MEDICATIONS  (STANDING):  ALBUTerol/ipratropium for Nebulization 3 milliLiter(s) Nebulizer every 4 hours  artificial  tears Solution 1 Drop(s) Both EYES four times a day  cefTRIAXone   IVPB 1 Gram(s) IV Intermittent every 24 hours  chlorhexidine 0.12% Liquid 15 milliLiter(s) Swish and Spit two times a day  dextrose 5%. 1000 milliLiter(s) (50 mL/Hr) IV Continuous <Continuous>  dextrose 50% Injectable 12.5 Gram(s) IV Push once  dextrose 50% Injectable 25 Gram(s) IV Push once  dextrose 50% Injectable 25 Gram(s) IV Push once  doxycycline IVPB      doxycycline IVPB 100 milliGRAM(s) IV Intermittent every 12 hours  heparin  Injectable 5000 Unit(s) SubCutaneous every 8 hours  insulin lispro (HumaLOG) corrective regimen sliding scale   SubCutaneous every 4 hours  pantoprazole  Injectable 40 milliGRAM(s) IV Push daily  propofol Infusion 5 MICROgram(s)/kG/Min (2.343 mL/Hr) IV Continuous <Continuous>      Allergies  No Known Allergies  Intolerances    LABS:                        14.4   11.6  )-----------( 172      ( 03 Oct 2017 23:56 )             41.4     10-04    130<L>  |  95<L>  |  46<H>  ----------------------------<  228<H>  4.3   |  22  |  2.29<H>    Ca    7.9<L>      04 Oct 2017 09:42  Phos  4.2     10-04  Mg     2.0     10-04    TPro  5.9<L>  /  Alb  3.2<L>  /  TBili  0.4  /  DBili  x   /  AST  45<H>  /  ALT  76<H>  /  AlkPhos  71  10-04    PT/INR - ( 03 Oct 2017 20:58 )   PT: 11.0 sec;   INR: 0.99          PTT - ( 03 Oct 2017 20:58 )  PTT:25.7 sec  Urinalysis Basic - ( 03 Oct 2017 21:54 )    Color: Yellow / Appearance: Clear / S.020 / pH: x  Gluc: x / Ketone: NEGATIVE  / Bili: Negative / Urobili: 0.2 E.U./dL   Blood: x / Protein: NEGATIVE mg/dL / Nitrite: NEGATIVE   Leuk Esterase: NEGATIVE / RBC: x / WBC x   Sq Epi: x / Non Sq Epi: x / Bacteria: x        RADIOLOGY & ADDITIONAL TESTS:  < from: Xray Chest 1 View AP-PORTABLE IMMEDIATE (10.04.17 @ 00:56) >  New endotracheal tube in proper position.     Slightly worsening hazy opacification, predominantly of the perihilar and   bibasilar lungs, representing worsening edema and/or multifocal   consolidation.

## 2017-10-04 NOTE — PROGRESS NOTE ADULT - PROBLEM SELECTOR PLAN 2
- resolved   - ECG cHAnges   - treated overnight and early morning   - monitor the serum level of potassium

## 2017-10-04 NOTE — PROGRESS NOTE ADULT - ASSESSMENT
Patient is a 72 year old male with a history of coronary artery disease, hypertension, and diabetes mellitus whom presented to the hospital with renal failure and hyperkalemia. Patient went into acute hypoxic respiratory failure d/t septic shock in the MICU around midnight s/p intubation.     Neuro:   -patient is intubated and sedated     Respiratory:   #Acute Hypoxic Respiratory Failure 2/2 septic shock d/t bilateral PNA    -c/w mechanical ventilation   -monitor ABGs  -c/w Rocephin + doxycycline     Cardio:   #CHF with defibrillator   -unknown EF, BNP elevated to 900s   -f/u Echo   -holding home medications spironolactone 50mg, indapamida 1.5mg   -f/u EP consult     #HTN   -holding home medication clonidine 0.2mg     #HLD  -holding home dose of atorvastatin 20mg     GI:   #Abdominal pain   -CT showing fluid around the gallbladder  -f/u gallbladder sonogram     Endo:   #Diabetes:   -on home gliclazide 60mg   -f/u HgA1c   -ISS     Renal:   #Acute renal failure, renal failure with unknown baseline. It could be due to obstructive nephropathy. This could also just be chronic kidney disease. Evidence on CT of renal cyst/calcified mass on L kidney   -f/u q4 BMPs  -f/u UA, urine lytes, urine protein/creatinine ratio   - retroperitoneal ultrasound to rule out obstruction, evaluate the size and echogenicity of the kidneys to evaluate chronicity of the renal failure   - Please monitor strict I/Os   - f/u renal rec's on dialysis may require hemodialysis  - Please avoid nephrotoxic medications, IV contrast, and NSAIDs    #Hyperkalemia  -Patient's hyperkalemia is likely related to renal failure along with the use of ARBs  -Patient is responding to medical therapy and potassium has improved from 8.1 to 6.8  -f/u q6 BMPs  - medical management with Kayexalate / insulin + dextrose/calcium gluconate   -f/u renal rec's     #Metabolic acidosis  -Patient has non-anion gap metabolic acidosis in the setting of renal failure.   - D5W with 150 mEq of sodium bicarbonate at 75 cc/hr   - Monitor electrolytes, f/u q6 BMPs    #Hyponatremia  - Patients hyponatremia could be due to hyperosmolar hyponatremia   - f/u urine sodium and urine osmolality   - Monitor labs q4h   - Goal corrected sodium at 135 Patient is a 72 year old male with a history of coronary artery disease, hypertension, and diabetes mellitus whom presented to the hospital with renal failure and hyperkalemia. Patient went into acute hypoxic respiratory failure d/t septic shock in the MICU around midnight s/p intubation.     Neuro:   -patient is intubated and sedated, withdraws to pain.     Respiratory:   #Acute Hypoxic Respiratory Failure 2/2 septic shock d/t bilateral PNA    -c/w mechanical ventilation   -monitor ABGs  -c/w Rocephin + doxycycline for CAP    Cardio:   #chronic CHF with defibrillator   -unknown EF, BNP elevated to 900s   -f/u Echo   -holding home medications spironolactone 50mg, indapamide 1.5mg   -EP Consult: 3 episodes SVT that spontaneously resolved Sep 15. Baseline increased from 45 bpm to 50 bpm.     #HTN   -holding home medication clonidine 0.2mg     #HLD  -holding home dose of atorvastatin 20mg     GI:   #Abdominal pain   -CT showing fluid around the gallbladder  -f/u gallbladder sonogram     Endo:   #Diabetes:   -on home gliclazide 60mg   -f/u HgA1c   -ISS     Renal:   #Acute renal failure, renal failure with unknown baseline.   - Unclear if patient has a history of CKD or if this is an acute insult given language barrier   - f/u UA, urine lytes, urine protein/creatinine ratio   - Evidence on CT of renal cyst/calcified mass on L kidney   - f/u q4 BMPs  - retroperitoneal ultrasound to rule out obstruction, evaluate the size and echogenicity of the kidneys to evaluate chronicity of the renal failure   - Please monitor strict I/Os   - f/u renal rec's on dialysis, may require hemodialysis  - Please avoid nephrotoxic medications, IV contrast, and NSAIDs    #Hyperkalemia  -Patient's hyperkalemia is likely related to renal failure along with the use of ARBs  -Patient is responding to medical therapy and potassium has improved from 8.1 to 6.8  -f/u q6 BMPs  - medical management with Kayexalate / insulin + dextrose/calcium gluconate   -f/u renal rec's     #Metabolic acidosis  - Patient has an anion gap metabolic acidosis in the setting of renal failure, likely due to uremia   - Monitor electrolytes    #Hyponatremia  - f/u urine sodium and urine osmolality   - Monitor labs q4h   - Goal corrected sodium at 135    F: None   E: Replete PRN  N: NPO except meds  Prophylaxis: HSQ Patient is a 72 year old male with a history of coronary artery disease, hypertension, and diabetes mellitus whom presented to the hospital with renal failure and hyperkalemia. Patient went into acute hypoxic respiratory failure d/t septic shock in the MICU around midnight s/p intubation.     Neuro:   -patient is intubated and sedated, withdraws to pain.   inue   Respiratory:   #Acute Hypoxic Respiratory Failure 2/2 septic shock d/t bilateral PNA    -c/w mechanical ventilation   -monitor ABGs  -c/w Rocephin + doxycycline for CAP    Cardio:   #chronic CHF with defibrillator   -unknown EF, BNP elevated to 900s   -f/u Echo   -holding home medications spironolactone 50mg, indapamide 1.5mg   -EP Consult: 3 episodes SVT that spontaneously resolved Sep 15. Baseline increased from 45 bpm to 50 bpm.     #HTN   -holding home medication clonidine 0.2mg     #HLD  -holding home dose of atorvastatin 20mg     GI:   #Abdominal pain   -CT showing fluid around the gallbladder  -f/u gallbladder sonogram     Endo:   #Diabetes:   -on home gliclazide 60mg   -f/u HgA1c   -ISS     Renal:   #Acute renal failure, renal failure with unknown baseline.   - Unclear if patient has a history of CKD or if this is an acute insult given language barrier   - f/u UA, urine lytes, urine protein/creatinine ratio   - Evidence on CT of renal cyst/calcified mass on L kidney   - f/u q4 BMPs  - retroperitoneal ultrasound to rule out obstruction, evaluate the size and echogenicity of the kidneys to evaluate chronicity of the renal failure   - Please monitor strict I/Os   - f/u renal rec's on dialysis, may require hemodialysis  - Please avoid nephrotoxic medications, IV contrast, and NSAIDs    #Hyperkalemia  -Patient's hyperkalemia is likely related to renal failure along with the use of ARBs  -Patient is responding to medical therapy and potassium has improved from 8.1 to 6.8  -f/u q6 BMPs  - medical management with Kayexalate / insulin + dextrose/calcium gluconate   -f/u renal rec's     #Metabolic acidosis  - Patient has an anion gap metabolic acidosis in the setting of renal failure, likely due to uremia   - Monitor electrolytes    #Hyponatremia  - f/u urine sodium and urine osmolality   - Monitor labs q4h   - Goal corrected sodium at 135    F: None   E: Replete PRN  N: NPO except meds  Prophylaxis: HSQ

## 2017-10-04 NOTE — PROGRESS NOTE ADULT - SUBJECTIVE AND OBJECTIVE BOX
Objective and subjective   The patient is intubated and sedated. On ventilator, in shock (most likely septic - not clear from the ICU note) on levophed drip.       He was transferred last night from outside facility where he went because of generalized malaise. Found to be hyperkalemic with deterioration of the renal function. ECG changes - bradycardia, AV block first degree and T wave> treated conservatively overnight and this morning. Intubated for acute hypoxemic respiratory failure contributed to ARDS possibly due to pneumonia. The renal service is following the case for hyperkalemia and renal failure     Patient seen and examined at bedside.     ALBUTerol/ipratropium for Nebulization 3 milliLiter(s) every 4 hours  artificial  tears Solution 1 Drop(s) four times a day  cefTRIAXone   IVPB 1 Gram(s) every 24 hours  chlorhexidine 0.12% Liquid 15 milliLiter(s) two times a day  dextrose 5% 1000 milliLiter(s) <Continuous>  dextrose 5%. 1000 milliLiter(s) <Continuous>  dextrose 50% Injectable 12.5 Gram(s) once  dextrose 50% Injectable 25 Gram(s) once  dextrose 50% Injectable 25 Gram(s) once  dextrose Gel 1 Dose(s) once PRN  doxycycline IVPB     doxycycline IVPB 100 milliGRAM(s) once  doxycycline IVPB 100 milliGRAM(s) every 12 hours  glucagon  Injectable 1 milliGRAM(s) once PRN  heparin  Injectable 5000 Unit(s) every 8 hours  insulin lispro (HumaLOG) corrective regimen sliding scale   every 4 hours  pantoprazole  Injectable 40 milliGRAM(s) daily  propofol Infusion 5 MICROgram(s)/kG/Min <Continuous>      Allergies    No Known Allergies    Intolerances        T(C): , Max: 37.6 (10-04-17 @ 00:00)  T(F): , Max: 99.6 (10-04-17 @ 00:00)  HR: 58 (10-04-17 @ 10:00)  BP: 112/67 (10-04-17 @ 09:00)  BP(mean): 89 (10-04-17 @ 09:00)  RR: 16 (10-04-17 @ 10:00)  SpO2: 98% (10-04-17 @ 10:00)  Wt(kg): --    10-03 @ 07:01  -  10-04 @ 07:00  --------------------------------------------------------  IN:    dextrose 5%: 675 mL    norepinephrine Infusion: 20 mL    propofol Infusion: 37.4 mL    propofol Infusion: 105.3 mL    sodium chloride 0.9%: 300 mL    Sodium Chloride 0.9% IV Bolus: 500 mL    Solution: 100 mL    Solution: 50 mL    Solution: 250 mL    Solution: 150 mL    Solution: 100 mL  Total IN: 2287.7 mL    OUT:    Indwelling Catheter - Urethral: 715 mL  Total OUT: 715 mL    Total NET: 1572.7 mL      10-04 @ 07:01  -  10-04 @ 11:20  --------------------------------------------------------  IN:    dextrose 5%: 150 mL    norepinephrine Infusion: 20 mL    propofol Infusion: 37.4 mL    sodium chloride 0.9%: 100 mL  Total IN: 307.4 mL    OUT:    Indwelling Catheter - Urethral: 135 mL  Total OUT: 135 mL    Total NET: 172.4 mL        physical exam:   Alert and oriented   Normal air entry in the lungs, no wheezing, no crackles, no rales   RRR, normal s1/s2, no murmurs, rubs or gallops   Abdomen - soft, non-tender,non-distended, normal bowel sounds   Extremities: mild peripheral edema   haS  a morales catheter     Height (cm): 175 (10-04 @ 00:17)  Weight (kg): 78.1 (10-04 @ 00:16)  BMI (kg/m2): 25.5 (10-04 @ 00:17)  BSA (m2): 1.94 (10-04 @ 00:17)      LABS:                        14.4   11.6  )-----------( 172      ( 03 Oct 2017 23:56 )             41.4     10-04    130<L>  |  95<L>  |  46<H>  ----------------------------<  228<H>  4.3   |  22  |  2.29<H>    Ca    7.9<L>      04 Oct 2017 09:42  Phos  4.2     10-04  Mg     2.0     10-04    TPro  5.9<L>  /  Alb  3.2<L>  /  TBili  0.4  /  DBili  x   /  AST  45<H>  /  ALT  76<H>  /  AlkPhos  71  10-04    Hemoglobin A1C, Whole Blood: 7.0 % <H> [4.0 - 5.6] (10-04 @ 09:41)  Uric Acid, Serum: 5.8 mg/dL [3.4 - 8.8] (10-04 @ 04:54)    PT/INR - ( 03 Oct 2017 20:58 )   PT: 11.0 sec;   INR: 0.99          PTT - ( 03 Oct 2017 20:58 )  PTT:25.7 sec  Urinalysis Basic - ( 03 Oct 2017 21:54 )    Color: Yellow / Appearance: Clear / S.020 / pH: x  Gluc: x / Ketone: NEGATIVE  / Bili: Negative / Urobili: 0.2 E.U./dL   Blood: x / Protein: NEGATIVE mg/dL / Nitrite: NEGATIVE   Leuk Esterase: NEGATIVE / RBC: x / WBC x   Sq Epi: x / Non Sq Epi: x / Bacteria: x      Sodium, Random Urine: 60 mmol/L (10-03 @ 21:54)  Creatinine, Random Urine: 82 mg/dL (10-03 @ 21:54)        RADIOLOGY & ADDITIONAL STUDIES:  < from: CT Abdomen and Pelvis No Cont (10.04.17 @ 03:36) >  There is a large peripherally calcified left renal cyst measuring 8.3 cm.   Nohydronephrosis or nephrolithiasis is seen. Calcific atherosclerotic   disease of the abdominal aorta is noted without aneurysm.    < end of copied text >

## 2017-10-04 NOTE — H&P ADULT - ASSESSMENT
Patient is a 72 year old male with a history of coronary artery disease, hypertension, and diabetes mellitus whom presented to the hospital with renal failure and hyperkalemia. Patient acutely decompensated in the MICU around midnight s/p intubation.     Neuro:   -patient is intubated and sedated     Respiratory:   #Acute Respiratory Failure   -c/w mechanical ventilation   -monitor ABGs    #Aspiration PNA       Cardio:   #CHF   -unknown EF    #HLD  -holding home dose of atorvastatin 20mg       Renal:   #Acute renal failure, renal failure with unknown baseline  -Renal failure could be due to prerenal azotemia. Acute GN is a less likely possibility as it does not fit with the clinical picture. It could be due to obstructive nephropathy. This could also just be chronic kidney disease  -f/u q4 BMPs  -f/u UA, urine lytes, urine protein/creatinine ratio   - retroperitoneal ultrasound to rule out obstruction, evaluate the size and echogenicity of the kidneys to evaluate chronicity of the renal failure   - Please monitor strict I/Os   - f/u renal rec's on dialysis may require hemodialysis  - Please avoid nephrotoxic medications, IV contrast, and NSAIDs    #Hyperkalemia  -Patient's hyperkalemia is likely related to renal failure along with the use of ARBs  -Patient is responding to medical therapy and potassium has improved from 8.1 to 6.8  -f/u q6 BMPs  - medical management with kayexelate/insulin + dextrose/calcium gluconate   -f/u renal rec's     #Metabolic acidosis  -Patient has non-anion gap metabolic acidosis in the setting of renal failure.   - D5W with 150 mEq of sodium bicarbonate at 75 cc/hr   - Monitor electrolytes, f/u q6 BMPs    #Hyponatremia  - Patients hyponatremia could be due to hyperosmolar hyponatremia   - f/u urine sodium and urine osmolality   - Monitor labs q4h   - Goal corrected sodium at 135. Patient is a 72 year old male with a history of coronary artery disease, hypertension, and diabetes mellitus whom presented to the hospital with renal failure and hyperkalemia. Patient acutely decompensated in the MICU around midnight s/p intubation.     Neuro:   -patient is intubated and sedated     Respiratory:   #Acute Respiratory Failure   -c/w mechanical ventilation with low TV 6kg  -monitor ABGs    #Aspiration PNA     #Underd      Cardio:   #CHF   -unknown EF, BNP elevated to 900s   -f/u Echo   -holding home medications spironolactone 50mg, indapamida 1.5mg     #HTN   -holding home medication clonidine 0.2mg     #HLD  -holding home dose of atorvastatin 20mg     Endo:   #Diabetes:   -on home gliclazide 60mg   -f/u HgA1c   -ISS     Renal:   #Acute renal failure, renal failure with unknown baseline  -Renal failure could be due to prerenal azotemia. Acute GN is a less likely possibility as it does not fit with the clinical picture. It could be due to obstructive nephropathy. This could also just be chronic kidney disease  -f/u q4 BMPs  -f/u UA, urine lytes, urine protein/creatinine ratio   - retroperitoneal ultrasound to rule out obstruction, evaluate the size and echogenicity of the kidneys to evaluate chronicity of the renal failure   - Please monitor strict I/Os   - f/u renal rec's on dialysis may require hemodialysis  - Please avoid nephrotoxic medications, IV contrast, and NSAIDs    #Hyperkalemia  -Patient's hyperkalemia is likely related to renal failure along with the use of ARBs  -Patient is responding to medical therapy and potassium has improved from 8.1 to 6.8  -f/u q6 BMPs  - medical management with kayexelate/insulin + dextrose/calcium gluconate   -f/u renal rec's     #Metabolic acidosis  -Patient has non-anion gap metabolic acidosis in the setting of renal failure.   - D5W with 150 mEq of sodium bicarbonate at 75 cc/hr   - Monitor electrolytes, f/u q6 BMPs    #Hyponatremia  - Patients hyponatremia could be due to hyperosmolar hyponatremia   - f/u urine sodium and urine osmolality   - Monitor labs q4h   - Goal corrected sodium at 135. Patient is a 72 year old male with a history of coronary artery disease, hypertension, and diabetes mellitus whom presented to the hospital with renal failure and hyperkalemia. Patient went into acute hypoxic respiratory failure d/t septic shock in the MICU around midnight s/p intubation.     Neuro:   -patient is intubated and sedated     Respiratory:   #Acute Hypoxic Respiratory Failure 2/2 septic shock d/t bilateral PNA    -c/w mechanical ventilation   -monitor ABGs  -c/w Rocephin + doxycycline     Cardio:   #CHF with defibrillator   -unknown EF, BNP elevated to 900s   -f/u Echo   -holding home medications spironolactone 50mg, indapamida 1.5mg   -f/u EP consult     #HTN   -holding home medication clonidine 0.2mg     #HLD  -holding home dose of atorvastatin 20mg     GI:   #Abdominal pain   -CT showing fluid around the gallbladder  -f/u gallbladder sonogram     Endo:   #Diabetes:   -on home gliclazide 60mg   -f/u HgA1c   -ISS     Renal:   #Acute renal failure, renal failure with unknown baseline. It could be due to obstructive nephropathy. This could also just be chronic kidney disease. Evidence on CT of renal cyst/calcified mass on L kidney   -f/u q4 BMPs  -f/u UA, urine lytes, urine protein/creatinine ratio   - retroperitoneal ultrasound to rule out obstruction, evaluate the size and echogenicity of the kidneys to evaluate chronicity of the renal failure   - Please monitor strict I/Os   - f/u renal rec's on dialysis may require hemodialysis  - Please avoid nephrotoxic medications, IV contrast, and NSAIDs    #Hyperkalemia  -Patient's hyperkalemia is likely related to renal failure along with the use of ARBs  -Patient is responding to medical therapy and potassium has improved from 8.1 to 6.8  -f/u q6 BMPs  - medical management with Kayexalate / insulin + dextrose/calcium gluconate   -f/u renal rec's     #Metabolic acidosis  -Patient has non-anion gap metabolic acidosis in the setting of renal failure.   - D5W with 150 mEq of sodium bicarbonate at 75 cc/hr   - Monitor electrolytes, f/u q6 BMPs    #Hyponatremia  - Patients hyponatremia could be due to hyperosmolar hyponatremia   - f/u urine sodium and urine osmolality   - Monitor labs q4h   - Goal corrected sodium at 135

## 2017-10-04 NOTE — PROGRESS NOTE ADULT - ASSESSMENT
This is 72 year old male with PMH stated above. Now renal service follows the case for renal failure and hyperkalemia. The patient is shock, intubated

## 2017-10-04 NOTE — DIETITIAN INITIAL EVALUATION ADULT. - NS AS NUTRI INTERV ENTERAL NUTRITION
Glucerna 1.2 at 60 mL x 24 hr (route per MD) to provide: 1440 mL TV, 1728 kcal, 86 g protein, & 1159 mL fluid

## 2017-10-04 NOTE — PROCEDURE NOTE - SUPERVISORY STATEMENT
performed by myself. poor radial pulses, patieint with severe pna and worsening hypoxia requiring abg monitoring.

## 2017-10-04 NOTE — H&P ADULT - ATTENDING COMMENTS
72M DM CAD CHF ICD bilateral lower lobe pna cw aspiration, R>L, acute/chronic renal dysfunction with hyperkalemia. hyponatremia with U somo cw SIADH. acute hypoxic respiratory failure requiring mech vent. hypotension requiring IVF and levophed pressor.     1. sedated with propofol  2. jayesh inserted, plan for central line. levophed. lactate decreasing and UO adequate, suggests adequate perfusion at this time.  3. ARDS per critieria. 6nl/kg Vt. monitor serial abg,jayesh established.  4. vanco zosyn for severe pna in icu  5. presented with abd pain, CT: fluid around GB, plan for sono GB.  5. acute chronic renal failure: liley chronic as per CT findings atrophic kidney. noted calcified mass on L kidney unclear etiology. consider mri/contrasts study at interval if possible.  6. hyperkalemia, kayexalate D50 insulin  7. hyponatremia, treat with NS, goal correct 6meq dialy, may require 3% saline.

## 2017-10-04 NOTE — PROGRESS NOTE ADULT - ATTENDING COMMENTS
Renal function is improving; hyperkalemia has resolved.  Continue current treatment plan.  Root cause of patient's deterioration is not yet apparent.  May be cardiogenic or infection.

## 2017-10-04 NOTE — PROGRESS NOTE ADULT - ATTENDING COMMENTS
Patient seen and examined with house-staff during bedside rounds.  Resident note read, including vitals, physical findings, laboratory data, and radiological reports.   Revisions included below.  Direct personal management at bed side and extensive interpretation of the data.  Plan was outlined and discussed in details with the housestaff.  Decision making of high complexity  respiratory failure secondary to CAP and renal failure with hyperlkalemia.  Continue sedation.  DC A line. Continue antibiotics.  DC aldactone.  Cr improved.  ECHO ad EP evaluation

## 2017-10-04 NOTE — PROGRESS NOTE ADULT - SUBJECTIVE AND OBJECTIVE BOX
EPS Device interrogation    Indication:     Device model: 	SJM Fortify ST VR 1235-4 	Implanting Physician: Dr. Grayson   ( ICD implanted in [place] )  Functioning Mode:  VVI			    Underlying Rhythm: VS    Pacemaker dependency:  No    Battery status: 76%, 7.4 years  Interrogating parameters:   				RA			RV			LV    Sense:                                                                              11.9 mV    Threshold:                                                                       11.9  V @ 0.5 ms    Pacing Impedance:                                                                   410 om    Shock Impedance:                                                                       43  om    Events/Alert:  3 episodes of SVT, self resolving     Parameter change: Baseline increased to 50 BPM    For ICD only:  tachy therapy – unchanged   Normal function ICD      [ ]EPS attending: Interrogation reviewed. Agree with above.

## 2017-10-04 NOTE — H&P ADULT - NSHPPHYSICALEXAM_GEN_ALL_CORE
VS:   T(C): 36.3 (10-04-17 @ 05:46), Max: 37.6 (10-04-17 @ 00:00)  T(F): 97.3 (10-04-17 @ 05:46), Max: 99.6 (10-04-17 @ 00:00)  HR: 50 (10-04-17 @ 08:00) (44 - 78)  BP: 104/68 (10-04-17 @ 07:00) (82/60 - 247/152)  BP(mean): 91 (10-04-17 @ 07:00) (65 - 224)  RR: 18 (10-04-17 @ 08:00) (13 - 27)  SpO2: 98% (10-04-17 @ 08:00) (87% - 100%)  Wt(kg): --    General: patient is intubated and sedated   Head: NC/AT  ENT: ET tube placed, no nasal discharge; uvula midline, no oropharyngeal erythema or exudates; MMM  Lungs: good air movement upper lung fields, +bibasilar rales, comfortable breathing with ventilator    Cardio: bradycardic, +S1S2,   GI: distended, +BSx4, unable to assess tenderness  Extremities: trace upper extremity peripheral edema, no lower peripheral edema, no cyanosis, no clubbing   Vascular: weak radial and femoral pulses   Dermatologic: skin warm, dry and intact; no rashes, wounds, or scars  Lymphatic: no submandibular or cervical LAD  Neurologic: AAOx0

## 2017-10-04 NOTE — PROCEDURE NOTE - NSPROCDETAILS_GEN_ALL_CORE
patient pre-oxygenated, tube inserted, placement confirmed/connected to ventilator
location identified, draped/prepped, sterile technique used, needle inserted/introduced/connected to a pressurized flush line/hemostasis with direct pressure, dressing applied/Seldinger technique/all materials/supplies accounted for at end of procedure/positive blood return obtained via catheter/sutured in place

## 2017-10-04 NOTE — PROGRESS NOTE ADULT - PROBLEM SELECTOR PLAN 1
- not clear the baseline renal function ( the patient from [facility] it will be difficult to obtain any information in this regard  - possibly underlying diabetic kidney disease  - PCR - 0.2   - renal function improving 2.29 creatinine   - overnight dorop of the BP 86/60 - could have ATN   - potasium - see below   - sodium - 130   - bicarbonate - 22   - please consider stopping the bicarb drip   - meatbolic acidosis improved, had slightly elevation of lactate level   - morales placed last night - 710 ml according to the chart drained spontaneously   - according to the nurse making 30 ml to 100 ml per hour   - please consider  evaluation of the left kidney cyst

## 2017-10-04 NOTE — DIETITIAN INITIAL EVALUATION ADULT. - OTHER INFO
Patient is a 72 year old male with a history of coronary artery disease, hypertension, and diabetes mellitus whom presented to the hospital with renal failure and hyperkalemia. Patient went into acute hypoxic respiratory failure d/t septic shock in the MICU around midnight s/p intubation. Currently NPO with propofol infusing. MAP 89.

## 2017-10-04 NOTE — H&P ADULT - NSHPLABSRESULTS_GEN_ALL_CORE
Labs:             14.2   12.3  )-----------( 190      ( 03 Oct 2017 14:46 )             42.1     10-03    126<L>  |  100  |  57<H>  ----------------------------<  311<H>  7.2<HH>   |  16<L>  |  3.00<H>    Ca    8.5      03 Oct 2017 18:05    TPro  8.2  /  Alb  4.0  /  TBili  0.7  /  DBili  x   /  AST  55<H>  /  ALT  57<H>  /  AlkPhos  86  10-03    Creatine Kinase, Serum: 93 U/L [39 - 308] (10-03 @ 14:46)    PT/INR - ( 03 Oct 2017 14:46 )   PT: 10.5 sec;   INR: 0.96          PTT - ( 03 Oct 2017 14:46 )  PTT:28.7 sec  Urinalysis Basic - ( 03 Oct 2017 15:52 )    Color: Yellow / Appearance: Clear / SG: >=1.030 / pH: x  Gluc: x / Ketone: Trace mg/dL  / Bili: Small / Urobili: 0.2 E.U./dL   Blood: x / Protein: 100 mg/dL / Nitrite: NEGATIVE   Leuk Esterase: NEGATIVE / RBC: < 5 /HPF / WBC < 5 /HPF   Sq Epi: x / Non Sq Epi: Rare /HPF / Bacteria: Many /HPF

## 2017-10-04 NOTE — H&P ADULT - HISTORY OF PRESENT ILLNESS
72y Male hx of DM, CAD, CHF has defibrillator/pacemaker BIBA Patient presented to the hospital with complaints of lower abdominal pain and dizziness. Patient stated the discomfort started around 7 am. Patient complained of nausea and lightheadedness. Patient states that he was [place] way to the airport when he started to feel weak and decided to go to the hospital. Sister at bedside reports that patient BP went low and decided needed to go to the hospital. Patient denied any fevers, chills, diarrhea, vomiting, or cough.     Patient was noted to have an elevated creatinine of 3.0 and hyperkalemic to 8.1 and with non-anion gap metabolic acidosis. 72y Male hx of DM, CAD, CHF has defibrillator/pacemaker BIBA Patient presented to the hospital with complaints of lower abdominal pain and dizziness. Patient stated the discomfort started around 7 am. Patient complained of nausea and lightheadedness. Patient states that he was on his way to the airport when he started to feel weak and decided to go to the hospital. Sister at bedside reports that patient BP went low and decided needed to go to the hospital. Patient denied any fevers, chills, diarrhea, vomiting, or cough at the time of t    Patient was noted to have an elevated creatinine of 3.0 and hyperkalemic to 8.1 and with non-anion gap metabolic acidosis. 72y poor historian ([place]ux[place] speaking) Male with PMHx of DM, CAD, CHF has defibrillator or pacemaker, kidney mass BIBA at [facility] due to lower abdominal pain, nausea and dizziness. Patient stated the discomfort started around 7 am. Patient states that he was on [place] way to the airport when he started to feel weak and decided to go to the hospital. Sister at bedside reports that patient BP went low. In the ED he denied any fevers, chills, diarrhea, vomiting, or cough at the time. In the ED it was noted on labs elevated creatinine of 3.0 and hyperkalemic to 8.1 and with non-anion gap metabolic acidosis. 72y poor historian (Turkish speaking) Male with PMHx of DM, CAD, CHF has defibrillator or pacemaker, kidney mass BIBA at [facility] due to lower abdominal pain, nausea and dizziness. Patient stated the discomfort started around 7 am. Patient states that he was on his way to the airport when he started to feel weak and decided to go to the hospital. Sister at bedside reports that patient BP went low. In the ED he denied any fevers, chills, diarrhea, vomiting, or cough at the time. In the ED it was noted on labs elevated creatinine of 3.0 and hyperkalemic to 8.1 and with non-anion gap metabolic acidosis. Patient was treated with Kayexalate, calcium gluconate, insulin + dextrose. Around midnight - patient began to complain of CP + SOB + diaphoretic + plethoric, became hypoxic to 80s on non-rebreather and decision was to intubate patient. He was started on propofol ggt and later started for levophed.

## 2017-10-05 DIAGNOSIS — J18.9 PNEUMONIA, UNSPECIFIED ORGANISM: ICD-10-CM

## 2017-10-05 DIAGNOSIS — R63.8 OTHER SYMPTOMS AND SIGNS CONCERNING FOOD AND FLUID INTAKE: ICD-10-CM

## 2017-10-05 DIAGNOSIS — I10 ESSENTIAL (PRIMARY) HYPERTENSION: ICD-10-CM

## 2017-10-05 DIAGNOSIS — I50.9 HEART FAILURE, UNSPECIFIED: ICD-10-CM

## 2017-10-05 DIAGNOSIS — R10.9 UNSPECIFIED ABDOMINAL PAIN: ICD-10-CM

## 2017-10-05 DIAGNOSIS — Z29.9 ENCOUNTER FOR PROPHYLACTIC MEASURES, UNSPECIFIED: ICD-10-CM

## 2017-10-05 LAB
ANION GAP SERPL CALC-SCNC: 14 MMOL/L — SIGNIFICANT CHANGE UP (ref 5–17)
BUN SERPL-MCNC: 28 MG/DL — HIGH (ref 7–23)
CALCIUM SERPL-MCNC: 9.3 MG/DL — SIGNIFICANT CHANGE UP (ref 8.4–10.5)
CHLORIDE SERPL-SCNC: 100 MMOL/L — SIGNIFICANT CHANGE UP (ref 96–108)
CO2 SERPL-SCNC: 26 MMOL/L — SIGNIFICANT CHANGE UP (ref 22–31)
CREAT SERPL-MCNC: 1.7 MG/DL — HIGH (ref 0.5–1.3)
GLUCOSE SERPL-MCNC: 207 MG/DL — HIGH (ref 70–99)
GRAM STN FLD: SIGNIFICANT CHANGE UP
HCT VFR BLD CALC: 41.7 % — SIGNIFICANT CHANGE UP (ref 39–50)
HGB BLD-MCNC: 14.5 G/DL — SIGNIFICANT CHANGE UP (ref 13–17)
LACTATE SERPL-SCNC: 2.2 MMOL/L — HIGH (ref 0.5–2)
MCHC RBC-ENTMCNC: 31.7 PG — SIGNIFICANT CHANGE UP (ref 27–34)
MCHC RBC-ENTMCNC: 34.8 G/DL — SIGNIFICANT CHANGE UP (ref 32–36)
MCV RBC AUTO: 91 FL — SIGNIFICANT CHANGE UP (ref 80–100)
PLATELET # BLD AUTO: 173 K/UL — SIGNIFICANT CHANGE UP (ref 150–400)
POTASSIUM SERPL-MCNC: 4.8 MMOL/L — SIGNIFICANT CHANGE UP (ref 3.5–5.3)
POTASSIUM SERPL-SCNC: 4.8 MMOL/L — SIGNIFICANT CHANGE UP (ref 3.5–5.3)
RBC # BLD: 4.58 M/UL — SIGNIFICANT CHANGE UP (ref 4.2–5.8)
RBC # FLD: 15.1 % — SIGNIFICANT CHANGE UP (ref 10.3–16.9)
SODIUM SERPL-SCNC: 140 MMOL/L — SIGNIFICANT CHANGE UP (ref 135–145)
SPECIMEN SOURCE: SIGNIFICANT CHANGE UP
WBC # BLD: 11.6 K/UL — HIGH (ref 3.8–10.5)
WBC # FLD AUTO: 11.6 K/UL — HIGH (ref 3.8–10.5)

## 2017-10-05 PROCEDURE — 74000: CPT | Mod: 26

## 2017-10-05 PROCEDURE — 76700 US EXAM ABDOM COMPLETE: CPT | Mod: 26

## 2017-10-05 PROCEDURE — 93970 EXTREMITY STUDY: CPT | Mod: 26

## 2017-10-05 PROCEDURE — 71010: CPT | Mod: 26

## 2017-10-05 RX ORDER — INSULIN LISPRO 100/ML
VIAL (ML) SUBCUTANEOUS
Qty: 0 | Refills: 0 | Status: DISCONTINUED | OUTPATIENT
Start: 2017-10-05 | End: 2017-10-06

## 2017-10-05 RX ORDER — AMLODIPINE BESYLATE 2.5 MG/1
5 TABLET ORAL DAILY
Qty: 0 | Refills: 0 | Status: DISCONTINUED | OUTPATIENT
Start: 2017-10-05 | End: 2017-10-05

## 2017-10-05 RX ORDER — AMLODIPINE BESYLATE 2.5 MG/1
5 TABLET ORAL ONCE
Qty: 0 | Refills: 0 | Status: COMPLETED | OUTPATIENT
Start: 2017-10-05 | End: 2017-10-05

## 2017-10-05 RX ORDER — ATORVASTATIN CALCIUM 80 MG/1
20 TABLET, FILM COATED ORAL AT BEDTIME
Qty: 0 | Refills: 0 | Status: DISCONTINUED | OUTPATIENT
Start: 2017-10-05 | End: 2017-10-06

## 2017-10-05 RX ORDER — AMLODIPINE BESYLATE 2.5 MG/1
10 TABLET ORAL DAILY
Qty: 0 | Refills: 0 | Status: DISCONTINUED | OUTPATIENT
Start: 2017-10-06 | End: 2017-10-06

## 2017-10-05 RX ADMIN — AMLODIPINE BESYLATE 5 MILLIGRAM(S): 2.5 TABLET ORAL at 11:47

## 2017-10-05 RX ADMIN — CHLORHEXIDINE GLUCONATE 15 MILLILITER(S): 213 SOLUTION TOPICAL at 07:18

## 2017-10-05 RX ADMIN — Medication 3 MILLILITER(S): at 06:21

## 2017-10-05 RX ADMIN — PROPOFOL 2.34 MICROGRAM(S)/KG/MIN: 10 INJECTION, EMULSION INTRAVENOUS at 05:33

## 2017-10-05 RX ADMIN — Medication 3 MILLILITER(S): at 10:54

## 2017-10-05 RX ADMIN — ATORVASTATIN CALCIUM 20 MILLIGRAM(S): 80 TABLET, FILM COATED ORAL at 22:40

## 2017-10-05 RX ADMIN — Medication 2: at 23:01

## 2017-10-05 RX ADMIN — HEPARIN SODIUM 5000 UNIT(S): 5000 INJECTION INTRAVENOUS; SUBCUTANEOUS at 15:05

## 2017-10-05 RX ADMIN — Medication 1 DROP(S): at 07:19

## 2017-10-05 RX ADMIN — Medication 2: at 08:28

## 2017-10-05 RX ADMIN — Medication 2: at 11:37

## 2017-10-05 RX ADMIN — Medication 1 DROP(S): at 11:37

## 2017-10-05 RX ADMIN — Medication 3 MILLILITER(S): at 13:39

## 2017-10-05 RX ADMIN — Medication 3 MILLILITER(S): at 18:32

## 2017-10-05 RX ADMIN — AMLODIPINE BESYLATE 5 MILLIGRAM(S): 2.5 TABLET ORAL at 15:05

## 2017-10-05 RX ADMIN — CEFTRIAXONE 100 GRAM(S): 500 INJECTION, POWDER, FOR SOLUTION INTRAMUSCULAR; INTRAVENOUS at 11:36

## 2017-10-05 RX ADMIN — PANTOPRAZOLE SODIUM 40 MILLIGRAM(S): 20 TABLET, DELAYED RELEASE ORAL at 11:36

## 2017-10-05 RX ADMIN — Medication 110 MILLIGRAM(S): at 11:36

## 2017-10-05 RX ADMIN — HEPARIN SODIUM 5000 UNIT(S): 5000 INJECTION INTRAVENOUS; SUBCUTANEOUS at 07:19

## 2017-10-05 RX ADMIN — PROPOFOL 2.34 MICROGRAM(S)/KG/MIN: 10 INJECTION, EMULSION INTRAVENOUS at 02:07

## 2017-10-05 RX ADMIN — Medication 3 MILLILITER(S): at 22:40

## 2017-10-05 RX ADMIN — HEPARIN SODIUM 5000 UNIT(S): 5000 INJECTION INTRAVENOUS; SUBCUTANEOUS at 22:40

## 2017-10-05 RX ADMIN — Medication 110 MILLIGRAM(S): at 23:24

## 2017-10-05 NOTE — PROGRESS NOTE ADULT - PROBLEM SELECTOR PLAN 8
Consistent Carb Diet  Full Code  Transfer to [facility] HSQ  - continue home gliclazide 60mg

## 2017-10-05 NOTE — PROGRESS NOTE ADULT - PROBLEM SELECTOR PLAN 5
Patient complained of abdominal pain on presentation which has since resolved  - CT abdomen negative  - US abdomen performed, follow up final read.

## 2017-10-05 NOTE — PROGRESS NOTE ADULT - ASSESSMENT
This is 72 year old male with PMH stated above. Now renal service follows the case for renal failure and hyperkalemia. the patient is awake and extubated with significant improvement of renal function

## 2017-10-05 NOTE — PROGRESS NOTE ADULT - ASSESSMENT
Patient is a 72 year old male with a history of coronary artery disease, hypertension, and diabetes mellitus whom presented to the hospital with renal failure and hyperkalemia. Patient went into acute hypoxic respiratory failure d/t septic shock in the MICU around midnight s/p intubation.     Neuro:   -patient is intubated and sedated, withdraws to pain.   inue   Respiratory:   #Acute Hypoxic Respiratory Failure 2/2 septic shock d/t bilateral PNA    -c/w mechanical ventilation   -monitor ABGs  -c/w Rocephin + doxycycline for CAP    Cardio:   #chronic CHF with defibrillator   -unknown EF, BNP elevated to 900s   -f/u Echo   -holding home medications spironolactone 50mg, indapamide 1.5mg   -EP Consult: 3 episodes SVT that spontaneously resolved Sep 15. Baseline increased from 45 bpm to 50 bpm.     #HTN   -holding home medication clonidine 0.2mg     #HLD  -holding home dose of atorvastatin 20mg     GI:   #Abdominal pain   -CT showing fluid around the gallbladder  -f/u gallbladder sonogram     Endo:   #Diabetes:   -on home gliclazide 60mg   -f/u HgA1c   -ISS     Renal:   #Acute renal failure, renal failure with unknown baseline.   - Unclear if patient has a history of CKD or if this is an acute insult given language barrier   - f/u UA, urine lytes, urine protein/creatinine ratio   - Evidence on CT of renal cyst/calcified mass on L kidney   - f/u q4 BMPs  - retroperitoneal ultrasound to rule out obstruction, evaluate the size and echogenicity of the kidneys to evaluate chronicity of the renal failure   - Please monitor strict I/Os   - f/u renal rec's on dialysis, may require hemodialysis  - Please avoid nephrotoxic medications, IV contrast, and NSAIDs    #Hyperkalemia  -Patient's hyperkalemia is likely related to renal failure along with the use of ARBs  -Patient is responding to medical therapy and potassium has improved from 8.1 to 6.8  -f/u q6 BMPs  - medical management with Kayexalate / insulin + dextrose/calcium gluconate   -f/u renal rec's     #Metabolic acidosis  - Patient has an anion gap metabolic acidosis in the setting of renal failure, likely due to uremia   - Monitor electrolytes    #Hyponatremia  - f/u urine sodium and urine osmolality   - Monitor labs q4h   - Goal corrected sodium at 135    F: None   E: Replete PRN  N: NPO except meds  Prophylaxis: HSQ Patient is a 72 year old male with a history of coronary artery disease, hypertension, and diabetes mellitus whom presented to the hospital with renal failure and hyperkalemia. Patient went into acute hypoxic respiratory failure d/t septic shock and required intubation. Is now doing well on NC, continuing with CAP treatment.      Respiratory:   CAP  -Patient initially intubated for Acute RF in the setting of septic shock 2/2 to PNA  - Will c/w Ceftriaxone and Doxy     Cardio:   #chronic CHF with defibrillator   - Normal ECHO yday with normal EF and no valvular issues   - holding home medications spironolactone 50mg, indapamide 1.5mg   - EP Consult: 3 episodes SVT that spontaneously resolved Sep 15. Baseline increased from 45 bpm to 50 bpm.   - Presentation unlikely cardiac in nature     #HTN   - C/w Norvasc 10mg     #HLD  - Atorvastatin 20mg     GI:   #Abdominal pain   -CT showing fluid around the gallbladder  -f/u gallbladder sonogram   - Abdomen distended and tympanic, but X-ray normal. Monitor for BMs    Endo:   #Diabetes:   -on home gliclazide 60mg   -f/u HgA1c   -C/w ISS     Renal:   #Acute renal failure, renal failure with unknown baseline.   - Unclear if patient has a history of CKD or if this is an acute insult given language barrier   - f/u UA, urine lytes, urine protein/creatinine ratio   - Evidence on CT of renal cyst/calcified mass on L kidney   - Renal rec: retroperitoneal ultrasound to rule out obstruction, evaluate the size and echogenicity of the kidneys to evaluate chronicity of the renal failure   - Please avoid nephrotoxic medications, IV contrast, and NSAIDs    #Resolved Hyperkalemia  -Patient's hyperkalemia is likely related to renal failure along with the use of ARBs  -Patient is responding to medical therapy and potassium has improved from 8.1 to 4.8  -s/p medical management with Kayexalate / insulin + dextrose/calcium gluconate       F: None   E: Replete PRN  N: Consistent Carb Diet   Prophylaxis: HSQ Patient is a 72 year old male with a history of coronary artery disease, hypertension, and diabetes mellitus whom presented to the hospital with renal failure and hyperkalemia. Patient went into acute hypoxic respiratory failure d/t septic shock and required intubation. Is now doing well on NC, continuing with CAP treatment.      Respiratory:   CAP  -Patient initially intubated for Acute RF in the setting of septic shock 2/2 to PNA, extubated today and tolerating transition to NC well.   - Will c/w Ceftriaxone and Doxy (Azithromycin not given due to prolonged QTc on EKG)    Cardio:   #chronic CHF with defibrillator   - Normal ECHO yday with normal EF and no valvular issues   - holding home medications spironolactone 50mg, indapamide 1.5mg, and clonidine 0.2mg   - EP Consult: 3 episodes SVT that spontaneously resolved Sep 15. Baseline increased from 45 bpm to 50 bpm.   - Presentation unlikely cardiac in nature     #HTN   - C/w Norvasc 10mg   - Holding other home medications given that they are not first line and patient presented hyperkalemic, so, will not re-start Spironolactone.    #HLD  - Atorvastatin 20mg     GI:   #Abdominal pain/Distension   - Abdomen is distended and tympanic on exam, but patient no longer complaining of pain.   - NG Tube in situ, draining about 800ml of what appears to be bilious fluid.   - CT showing fluid around the gallbladder  - f/u Abdominal U/S  - Abdomen distended and tympanic, but X-ray normal. Monitor for BMs. No Bowel Regimen in place.     Endo:   #Diabetes:   -on home gliclazide 60mg   -f/u HgA1c   -C/w ISS     Renal:   #Acute renal failure, renal failure with unknown baseline.   - Unclear if patient has a history of CKD or if this is an acute insult given language barrier   - Evidence on CT of renal cyst/calcified mass on L kidney   - Renal rec: retroperitoneal ultrasound to rule out obstruction, evaluate the size and echogenicity of the kidneys to evaluate chronicity of the renal failure  - Abdominal U/S performed   - Please avoid nephrotoxic medications, IV contrast, and NSAIDs    #Resolved Hyperkalemia  -Patient's hyperkalemia is likely related to renal failure along with the use of Spironolactone  -Patient is responding to medical therapy and potassium has improved from 8.1 to 4.8  -s/p medical management with Kayexalate / insulin + dextrose/calcium gluconate       F: None   E: Replete PRN  N: Consistent Carb Diet   Prophylaxis: HSQ

## 2017-10-05 NOTE — AIRWAY REMOVAL NOTE  ADULT & PEDS - ARTIFICAL AIRWAY REMOVAL COMMENTS
Pt is extubated as per doctor's order. No sign of respiratory distress noted, Vitals ae RR 11, Hr 83, Sat 97%.

## 2017-10-05 NOTE — PROGRESS NOTE ADULT - PROBLEM SELECTOR PLAN 2
Patient presented with Hyperkalemia to 8.1-  - Continue to hold spironolactone  - Renal consulted and will continue to follow  - Resolved  - Patient's hyperkalemia is likely related to renal failure along with the use of ARBs Patient presented with Hyperkalemia to 8.1-  - Continue to hold spironolactone  - Renal consulted and will continue to follow  - Resolved  - Patient's hyperkalemia is likely related to renal failure along with the use of spironolactone

## 2017-10-05 NOTE — PROGRESS NOTE ADULT - PROBLEM SELECTOR PLAN 1
Patient noted to have b/l infiltrate on CT chest. Course complicated by acute respiratory failure in the setting of septic shock likely secondary to PNA Patient noted to have b/l infiltrate on CT chest. Course complicated by acute respiratory failure in the setting of severe sepsis likely secondary to PNA.   - Continue Ceftriaxone and Doxycycline  - Check Urine legionella  - Check CXR in AM

## 2017-10-05 NOTE — PROGRESS NOTE ADULT - PROBLEM SELECTOR PLAN 6
Continue Norvasc  - Consider increasing regimen if continues to be elevated -on ISS, resume home medications on dc

## 2017-10-05 NOTE — PROGRESS NOTE ADULT - ATTENDING COMMENTS
pt seen and examined; pt w/ CAP intubated for acute respiratory failure s/p extubation transferred to regional floor  Tajik  [id number] used  PE  gen: awake, alert NAD  heent: perrla, mmm, no JVD  cvs: s1s2   lungs: +R basilar crackles  abd: protuberant but nontender  ext: no lower ext edema/ tenderness b/l     a/p:   1. septic shock/ acute respiratory failure: resolved; stable to floor , continuing CAP treatment  2. CAP:  on doxycycline and ceftriaxone; follow up CXR and urine legionella  3. hyperkalemia: monitor lytes, holding spironolactone  4. RC: improvinge, monitor renal function  5. CHF: plan as above  6. DM: ISS pt seen and examined; pt w/ CAP intubated for acute respiratory failure s/p extubation transferred to regional floor  Emirati  [id number] used  PE  gen: awake, alert NAD  heent: perrla, mmm, no JVD  cvs: s1s2   lungs: +R basilar crackles  abd: protuberant but nontender  ext: no lower ext edema/ tenderness b/l     a/p:   1. septic shock/ acute respiratory failure: resolved; stable for floor , continuing CAP treatment  2. CAP:  on doxycycline and ceftriaxone; follow up CXR and urine legionella  3. hyperkalemia: monitor lytes, holding spironolactone  4. RC: improvinge, monitor renal function  5. CHF: plan as above  6. DM: ISS

## 2017-10-05 NOTE — PROGRESS NOTE ADULT - PROBLEM SELECTOR PLAN 4
Patient with known CHF with AICD placement EF 55%  - Continue to hold home meds spironolactone 50mg, indapamide 1.5mg   - EP Consulted to interrogate PPM noted 3 episodes SVT that spontaneously resolved Sep 15. Baseline rate increased from 45 bpm to 50 bpm.   - EKG in AM

## 2017-10-05 NOTE — PROGRESS NOTE ADULT - PROBLEM SELECTOR PLAN 7
HSQ  - continue home gliclazide 60mg Continue Norvasc  - Consider increasing regimen if continues to be elevated

## 2017-10-05 NOTE — PROGRESS NOTE ADULT - PROBLEM SELECTOR PROBLEM 6
Nutrition, metabolism, and development symptoms Hypertension Type 2 diabetes mellitus with hyperglycemia, without long-term current use of insulin

## 2017-10-05 NOTE — PROGRESS NOTE ADULT - SUBJECTIVE AND OBJECTIVE BOX
Internal Medicine Transfer Acceptance Note    Hospital Course:   72M visiting from [place] with his sister has a history of CAD, Chronic pEFHF (10/4 normal ECHO, EF 55%, pacemaker in situ), HTN, and DM who presented from [facility] on 10/3 with complaint of abdominal pain, nausea, and dizziness. As per patient interview and chart review, patient was at the airport on his way back to [place] when he began to feel unwell and not like himself. He went to the [facility] and was found to have a         INTERVAL HPI/OVERNIGHT EVENTS:  No acute events overnight.   This morning, patient was alert, awake and requesting ET tube be removed.   After a successful CPAP trial, patient was extubated and was doing well on NC.     Dunn removed as patient has had good urine output.     VITAL SIGNS:  T(F): 98.3 (10-05-17 @ 13:39)  HR: 106 (10-05-17 @ 15:00)  BP: 164/95 (10-05-17 @ 15:00)  RR: 14 (10-05-17 @ 15:00)  SpO2: 94% (10-05-17 @ 15:00)  Wt(kg): --    Input & Output:    10-04-17 @ 07:01  -  10-05-17 @ 07:00  --------------------------------------------------------  IN: 2596.7 mL / OUT: 2925 mL / NET: -328.3 mL    10-05-17 @ 07:01  -  10-05-17 @ 15:55  --------------------------------------------------------  IN: 450 mL / OUT: 2825 mL / NET: -2375 mL    PHYSICAL EXAM:  	General: patient is awake and alert, saturating well on NC.    	Head: NC/AT  	ENT: no nasal discharge; uvula midline, no oropharyngeal erythema or exudates; MMM  	Lungs: good air movement upper lung fields, +bibasilar rales  	Cardio: +S1S2, no murmurs  	GI: distended and tympanic, hypoactive BS  	Extremities: L UE swollen, likely due to infiltration of IV  	Vascular: weak radial and femoral pulses   	Dermatologic: skin warm, dry and intact; no rashes, wounds, or scars  	Lymphatic: no submandibular or cervical LAD    Neurologic: Alert and Oriented to name, knows he is in hospital.       MEDICATIONS  (STANDING):  ALBUTerol/ipratropium for Nebulization 3 milliLiter(s) Nebulizer every 4 hours  artificial  tears Solution 1 Drop(s) Both EYES four times a day  atorvastatin 20 milliGRAM(s) Oral at bedtime  cefTRIAXone   IVPB 1 Gram(s) IV Intermittent every 24 hours  dextrose 5%. 1000 milliLiter(s) (50 mL/Hr) IV Continuous <Continuous>  dextrose 50% Injectable 12.5 Gram(s) IV Push once  dextrose 50% Injectable 25 Gram(s) IV Push once  dextrose 50% Injectable 25 Gram(s) IV Push once  doxycycline IVPB      doxycycline IVPB 100 milliGRAM(s) IV Intermittent every 12 hours  heparin  Injectable 5000 Unit(s) SubCutaneous every 8 hours  insulin lispro (HumaLOG) corrective regimen sliding scale   SubCutaneous Before meals and at bedtime  pantoprazole  Injectable 40 milliGRAM(s) IV Push daily    Allergies  No Known Allergies  Intolerances    LABS:                        14.5   11.6  )-----------( 173      ( 05 Oct 2017 09:01 )             41.7     10-05    140  |  100  |  28<H>  ----------------------------<  207<H>  4.8   |  26  |  1.70<H>    Ca    9.3      05 Oct 2017 09:01  Phos  4.2     10-04  Mg     2.0     10-04    TPro  5.9<L>  /  Alb  3.2<L>  /  TBili  0.4  /  DBili  x   /  AST  45<H>  /  ALT  76<H>  /  AlkPhos  71  10-04    PT/INR - ( 03 Oct 2017 20:58 )   PT: 11.0 sec;   INR: 0.99          PTT - ( 03 Oct 2017 20:58 )  PTT:25.7 sec  Urinalysis Basic - ( 03 Oct 2017 21:54 )    Color: Yellow / Appearance: Clear / S.020 / pH: x  Gluc: x / Ketone: NEGATIVE  / Bili: Negative / Urobili: 0.2 E.U./dL   Blood: x / Protein: NEGATIVE mg/dL / Nitrite: NEGATIVE   Leuk Esterase: NEGATIVE / RBC: x / WBC x   Sq Epi: x / Non Sq Epi: x / Bacteria: x        RADIOLOGY & ADDITIONAL TESTS:      Assessment:     Plan: [Systems Based] Internal Medicine Transfer Acceptance Note    Hospital Course:   72M visiting from [place] with his sister has a history of CAD, Chronic pEFHF (10/4 normal ECHO, EF 55%, pacemaker in situ), HTN, and DM who presented from [facility] on 10/3 with complaint of abdominal pain, nausea, and dizziness. As per patient interview and chart review, patient was at the airport on his way back to [place] when he began to feel unwell and not like himself. He went to the [facility] and was found to have hyperkalemia to 8,         INTERVAL HPI/OVERNIGHT EVENTS:  No acute events overnight.   This morning, patient was alert, awake and requesting ET tube be removed.   After a successful CPAP trial, patient was extubated and was doing well on NC.     Dunn removed as patient has had good urine output.     VITAL SIGNS:  T(F): 98.3 (10-05-17 @ 13:39)  HR: 106 (10-05-17 @ 15:00)  BP: 164/95 (10-05-17 @ 15:00)  RR: 14 (10-05-17 @ 15:00)  SpO2: 94% (10-05-17 @ 15:00)  Wt(kg): --    Input & Output:    10-04-17 @ 07:01  -  10-05-17 @ 07:00  --------------------------------------------------------  IN: 2596.7 mL / OUT: 2925 mL / NET: -328.3 mL    10-05-17 @ 07:01  -  10-05-17 @ 15:55  --------------------------------------------------------  IN: 450 mL / OUT: 2825 mL / NET: -2375 mL    PHYSICAL EXAM:  	General: patient is awake and alert, saturating well on NC.    	Head: NC/AT  	ENT: no nasal discharge; uvula midline, no oropharyngeal erythema or exudates; MMM  	Lungs: good air movement upper lung fields, +bibasilar rales  	Cardio: +S1S2, no murmurs  	GI: distended and tympanic, hypoactive BS  	Extremities: L UE swollen, likely due to infiltration of IV  	Vascular: weak radial and femoral pulses   	Dermatologic: skin warm, dry and intact; no rashes, wounds, or scars  	Lymphatic: no submandibular or cervical LAD    Neurologic: Alert and Oriented to name, knows he is in hospital.       MEDICATIONS  (STANDING):  ALBUTerol/ipratropium for Nebulization 3 milliLiter(s) Nebulizer every 4 hours  artificial  tears Solution 1 Drop(s) Both EYES four times a day  atorvastatin 20 milliGRAM(s) Oral at bedtime  cefTRIAXone   IVPB 1 Gram(s) IV Intermittent every 24 hours  dextrose 5%. 1000 milliLiter(s) (50 mL/Hr) IV Continuous <Continuous>  dextrose 50% Injectable 12.5 Gram(s) IV Push once  dextrose 50% Injectable 25 Gram(s) IV Push once  dextrose 50% Injectable 25 Gram(s) IV Push once  doxycycline IVPB      doxycycline IVPB 100 milliGRAM(s) IV Intermittent every 12 hours  heparin  Injectable 5000 Unit(s) SubCutaneous every 8 hours  insulin lispro (HumaLOG) corrective regimen sliding scale   SubCutaneous Before meals and at bedtime  pantoprazole  Injectable 40 milliGRAM(s) IV Push daily    Allergies  No Known Allergies  Intolerances    LABS:                        14.5   11.6  )-----------( 173      ( 05 Oct 2017 09:01 )             41.7     10-05    140  |  100  |  28<H>  ----------------------------<  207<H>  4.8   |  26  |  1.70<H>    Ca    9.3      05 Oct 2017 09:01  Phos  4.2     10-04  Mg     2.0     10-04    TPro  5.9<L>  /  Alb  3.2<L>  /  TBili  0.4  /  DBili  x   /  AST  45<H>  /  ALT  76<H>  /  AlkPhos  71  10-04    PT/INR - ( 03 Oct 2017 20:58 )   PT: 11.0 sec;   INR: 0.99          PTT - ( 03 Oct 2017 20:58 )  PTT:25.7 sec  Urinalysis Basic - ( 03 Oct 2017 21:54 )    Color: Yellow / Appearance: Clear / S.020 / pH: x  Gluc: x / Ketone: NEGATIVE  / Bili: Negative / Urobili: 0.2 E.U./dL   Blood: x / Protein: NEGATIVE mg/dL / Nitrite: NEGATIVE   Leuk Esterase: NEGATIVE / RBC: x / WBC x   Sq Epi: x / Non Sq Epi: x / Bacteria: x        RADIOLOGY & ADDITIONAL TESTS:      Assessment:     Plan: [Systems Based] Internal Medicine Transfer Acceptance Note    Hospital Course:   72M visiting from [place] with his sister has a history of CAD, Chronic pEFHF (10/4 normal ECHO, EF 55%, pacemaker in situ), HTN, and DM who presented from [facility] on 10/3 with complaint of abdominal pain, nausea, and dizziness. As per patient interview and chart review, patient was at the airport on his way back to [place] when he began to feel unwell and not like himself. He went to the [facility] and was found to have hyperkalemia to 8.1 with non-anion gap metabolic acidosis and elevated Cr to 3.0 (unknown baseline). Patient was treated with Kayexalate, calcium gluconate, insulin and dextrose.    Around midnight - patient began to complain of CP + SOB + diaphoretic + plethoric, and became hypoxic to the 80s on non-rebreather and decision was made to intubate patient, maintaining him on a propofol drip. Patient also had episodes of bradycardia in the 50's and 60's. Given that his baseline rate was unknown, concern was present for symptomatic bradycardia. EP interrogated pacemaker, which was placed in  in [place], and found it to be in good working condition. They changed the baseline rate from 45 t6o 50bpm. On X-ray, area of R focal lower lobe consolidation visualized, concerning for likely CAP for which patient will be treated with Ceftriaxone and Doxycycline (on EKG, pt had prolonged QTc - can not administer Azithromycin), which was started 10/4. With IVF along with treatment in ED, patient's kidney function seemed to return to baseline with downtrending Cr and K+. It is likely patient has an element of chronic CKD in the setting of Diabetes and HTN. BP noted to be increasing to SBP 170s and patient was restarted on home Norvasc 10mg daily. Continue to hold home meds: Spironolactone, Indapamide, and Clonidine. Patient noted to be improving with CT chest and XR chest in the morning. Patient was extubated today and maintaining good saturations on NC. Passed bedside dysphagia. Determined to be medically stable for stepdown to regional floors.     On Abdominal/Pelvic CT - patient found to have a R atrophic kidney and L cystic Renal Mass (patient aware of finding).       He is hemodynamically stable and amenable to treatment on the RMF.         INTERVAL HPI/OVERNIGHT EVENTS:  No acute events overnight.   This morning, patient was alert, awake and requesting ET tube be removed.   After a successful CPAP trial, patient was extubated and was doing well on NC.     Dunn removed as patient has had good urine output.     VITAL SIGNS:  T(F): 98.3 (10-05-17 @ 13:39)  HR: 106 (10-05-17 @ 15:00)  BP: 164/95 (10-05-17 @ 15:00)  RR: 14 (10-05-17 @ 15:00)  SpO2: 94% (10-05-17 @ 15:00)  Wt(kg): --    Input & Output:    10-04-17 @ 07:01  -  10-05-17 @ 07:00  --------------------------------------------------------  IN: 2596.7 mL / OUT: 2925 mL / NET: -328.3 mL    10-05-17 @ 07:01  -  10-05-17 @ 15:55  --------------------------------------------------------  IN: 450 mL / OUT: 2825 mL / NET: -2375 mL    PHYSICAL EXAM:  	General: patient is awake and alert, saturating well on NC.    	Head: NC/AT  	ENT: no nasal discharge; uvula midline, no oropharyngeal erythema or exudates; MMM  	Lungs: good air movement upper lung fields, +bibasilar rales  	Cardio: +S1S2, no murmurs  	GI: distended and tympanic, hypoactive BS  	Extremities: L UE swollen, likely due to infiltration of IV  	Vascular: weak radial and femoral pulses   	Dermatologic: skin warm, dry and intact; no rashes, wounds, or scars  	Lymphatic: no submandibular or cervical LAD    Neurologic: Alert and Oriented to name, knows he is in hospital.       MEDICATIONS  (STANDING):  ALBUTerol/ipratropium for Nebulization 3 milliLiter(s) Nebulizer every 4 hours  artificial  tears Solution 1 Drop(s) Both EYES four times a day  atorvastatin 20 milliGRAM(s) Oral at bedtime  cefTRIAXone   IVPB 1 Gram(s) IV Intermittent every 24 hours  dextrose 5%. 1000 milliLiter(s) (50 mL/Hr) IV Continuous <Continuous>  dextrose 50% Injectable 12.5 Gram(s) IV Push once  dextrose 50% Injectable 25 Gram(s) IV Push once  dextrose 50% Injectable 25 Gram(s) IV Push once  doxycycline IVPB      doxycycline IVPB 100 milliGRAM(s) IV Intermittent every 12 hours  heparin  Injectable 5000 Unit(s) SubCutaneous every 8 hours  insulin lispro (HumaLOG) corrective regimen sliding scale   SubCutaneous Before meals and at bedtime  pantoprazole  Injectable 40 milliGRAM(s) IV Push daily    Allergies  No Known Allergies  Intolerances    LABS:                        14.5   11.6  )-----------( 173      ( 05 Oct 2017 09:01 )             41.7     10-05    140  |  100  |  28<H>  ----------------------------<  207<H>  4.8   |  26  |  1.70<H>    Ca    9.3      05 Oct 2017 09:01  Phos  4.2     10-04  Mg     2.0     10-04    TPro  5.9<L>  /  Alb  3.2<L>  /  TBili  0.4  /  DBili  x   /  AST  45<H>  /  ALT  76<H>  /  AlkPhos  71  10-04    PT/INR - ( 03 Oct 2017 20:58 )   PT: 11.0 sec;   INR: 0.99          PTT - ( 03 Oct 2017 20:58 )  PTT:25.7 sec  Urinalysis Basic - ( 03 Oct 2017 21:54 )    Color: Yellow / Appearance: Clear / S.020 / pH: x  Gluc: x / Ketone: NEGATIVE  / Bili: Negative / Urobili: 0.2 E.U./dL   Blood: x / Protein: NEGATIVE mg/dL / Nitrite: NEGATIVE   Leuk Esterase: NEGATIVE / RBC: x / WBC x   Sq Epi: x / Non Sq Epi: x / Bacteria: x        RADIOLOGY & ADDITIONAL TESTS:      Assessment:     Plan: [Systems Based] Internal Medicine Transfer Acceptance Note    Hospital Course:   72M visiting from [place] with his sister has a history of CAD, Chronic pEFHF (10/4 normal ECHO, EF 55%, pacemaker in situ), HTN, and DM who presented from [facility] on 10/3 with complaint of abdominal pain, nausea, and dizziness. As per patient interview and chart review, patient was at the airport on his way back to [place] when he began to feel unwell and not like himself. He went to the [facility] and was found to have hyperkalemia to 8.1 with non-anion gap metabolic acidosis and elevated Cr to 3.0 (unknown baseline). Patient was treated with Kayexalate, calcium gluconate, insulin and dextrose.    Around midnight - patient began to complain of CP + SOB + diaphoretic + plethoric, and became hypoxic to the 80s on non-rebreather and decision was made to intubate patient, maintaining him on a propofol drip. Patient also had episodes of bradycardia in the 50's and 60's. Given that his baseline rate was unknown, concern was present for symptomatic bradycardia. EP interrogated pacemaker, which was placed in  in [place], and found it to be in good working condition. They changed the baseline rate from 45 t6o 50bpm. On X-ray, area of R focal lower lobe consolidation visualized, concerning for likely CAP for which patient will be treated with Ceftriaxone and Doxycycline (on EKG, pt had prolonged QTc - can not administer Azithromycin), which was started 10/4. With IVF along with treatment in ED, patient's kidney function seemed to return to baseline with downtrending Cr and K+. It is likely patient has an element of chronic CKD in the setting of Diabetes and HTN. BP noted to be increasing to SBP 170s and patient was restarted on home Norvasc 10mg daily. Continue to hold home meds: Spironolactone, Indapamide, and Clonidine. Patient noted to be improving with CT chest and XR chest in the morning. Patient was extubated today and maintaining good saturations on NC. Passed bedside dysphagia. Determined to be medically stable for stepdown to regional floors.     On Abdominal/Pelvic CT - patient found to have a R atrophic kidney and L cystic Renal Mass (patient aware of finding).       He is hemodynamically stable and amenable to treatment on the F.     Dunn removed as patient has had good urine output.     VITAL SIGNS:  Vital Signs Last 24 Hrs  T(C): 37.1 (05 Oct 2017 19:58), Max: 38.3 (05 Oct 2017 19:07)  T(F): 98.7 (05 Oct 2017 19:58), Max: 100.9 (05 Oct 2017 19:07)  HR: 90 (05 Oct 2017 19:58) (68 - 108)  BP: 146/93 (05 Oct 2017 19:58) (119/71 - 198/99)  BP(mean): 113 (05 Oct 2017 19:00) (84 - 156)  RR: 18 (05 Oct 2017 19:58) (10 - 25)  SpO2: 96% (05 Oct 2017 19:58) (94% - 100%)    Input & Output:    10-04-17 @ 07:01  -  10-05-17 @ 07:00  --------------------------------------------------------  IN: 2596.7 mL / OUT: 2925 mL / NET: -328.3 mL    10-05-17 @ 07:01  -  10-05-17 @ 15:55  --------------------------------------------------------  IN: 450 mL / OUT: 2825 mL / NET: -2375 mL      PHYSICAL EXAM:  Constitutional: WDWN sitting comfortably in chair; NAD  Head: NC/AT  Eyes: PERRLA, EOMI, clear conjunctiva  ENT: no nasal discharge; no oropharyngeal erythema or exudates; dry oral mucosa  Neck: supple; no JVD or thyromegaly  Respiratory: CTA B/L; no W/R/R, no retractions  Cardiac: +S1/S2; RRR; no M/R/G; PMI non-displaced  Gastrointestinal: soft, NT/ND; no rebound or guarding; +BS, no hepatosplenomegaly  Extremities: WWP, no clubbing or cyanosis; no peripheral edema  Vascular: 2+ radial, DP/PT pulses B/L  Lymphatic: no submandibular or cervical LAD  Neurologic: AAOx3; CNII-XII grossly intact; no focal deficits, motor 5/5 in UE and LE, Reflexes 2+ in UE and LE b/l    MEDICATIONS  (STANDING):  ALBUTerol/ipratropium for Nebulization 3 milliLiter(s) Nebulizer every 4 hours  artificial  tears Solution 1 Drop(s) Both EYES four times a day  atorvastatin 20 milliGRAM(s) Oral at bedtime  cefTRIAXone   IVPB 1 Gram(s) IV Intermittent every 24 hours  dextrose 5%. 1000 milliLiter(s) (50 mL/Hr) IV Continuous <Continuous>  dextrose 50% Injectable 12.5 Gram(s) IV Push once  dextrose 50% Injectable 25 Gram(s) IV Push once  dextrose 50% Injectable 25 Gram(s) IV Push once  doxycycline IVPB      doxycycline IVPB 100 milliGRAM(s) IV Intermittent every 12 hours  heparin  Injectable 5000 Unit(s) SubCutaneous every 8 hours  insulin lispro (HumaLOG) corrective regimen sliding scale   SubCutaneous Before meals and at bedtime  pantoprazole  Injectable 40 milliGRAM(s) IV Push daily    MEDICATIONS  (PRN):  dextrose Gel 1 Dose(s) Oral once PRN Blood Glucose LESS THAN 70 milliGRAM(s)/deciliter  glucagon  Injectable 1 milliGRAM(s) IntraMuscular once PRN Glucose LESS THAN 70 milligrams/deciliter      Allergies:  No Known Allergies  Intolerances    LABS:                        14.5   11.6  )-----------( 173      ( 05 Oct 2017 09:01 )             41.7     10-05    140  |  100  |  28<H>  ----------------------------<  207<H>  4.8   |  26  |  1.70<H>    Ca    9.3      05 Oct 2017 09:01  Phos  4.2     10-04  Mg     2.0     10-04    TPro  5.9<L>  /  Alb  3.2<L>  /  TBili  0.4  /  DBili  x   /  AST  45<H>  /  ALT  76<H>  /  AlkPhos  71  10-04    PT/INR - ( 03 Oct 2017 20:58 )   PT: 11.0 sec;   INR: 0.99          PTT - ( 03 Oct 2017 20:58 )  PTT:25.7 sec  Urinalysis Basic - ( 03 Oct 2017 21:54 )    Color: Yellow / Appearance: Clear / S.020 / pH: x  Gluc: x / Ketone: NEGATIVE  / Bili: Negative / Urobili: 0.2 E.U./dL   Blood: x / Protein: NEGATIVE mg/dL / Nitrite: NEGATIVE   Leuk Esterase: NEGATIVE / RBC: x / WBC x   Sq Epi: x / Non Sq Epi: x / Bacteria: x      RADIOLOGY & ADDITIONAL TESTS: Reviewed.

## 2017-10-05 NOTE — PROGRESS NOTE ADULT - PROBLEM SELECTOR PLAN 1
- not clear the baseline renal function ( the patient from [facility] it will be difficult to obtain any information in this regard  - possibly underlying diabetic kidney disease  - PCR - 0.2   - renal function improving 1.70 creatinine   - blood pressure stable   - potasium - 4.8  - sodium - 140  - bicarbonate - 26  - good urine output    - please consider  evaluation of the left kidney cyst

## 2017-10-05 NOTE — PROGRESS NOTE ADULT - SUBJECTIVE AND OBJECTIVE BOX
INTERVAL HPI/OVERNIGHT EVENTS:  No acute events overnight.   This morning, patient was alert, awake and requesting ET tube be removed.   After a successful CPAP trial, patient was extubated and was doing well on NC.     Dunn removed as patient has had good urine output.     VITAL SIGNS:  T(F): 98.3 (10-05-17 @ 13:39)  HR: 106 (10-05-17 @ 15:00)  BP: 164/95 (10-05-17 @ 15:00)  RR: 14 (10-05-17 @ 15:00)  SpO2: 94% (10-05-17 @ 15:00)  Wt(kg): --    Input & Output:    10-04-17 @ 07:01  -  10-05-17 @ 07:00  --------------------------------------------------------  IN: 2596.7 mL / OUT: 2925 mL / NET: -328.3 mL    10-05-17 @ 07:01  -  10-05-17 @ 15:55  --------------------------------------------------------  IN: 450 mL / OUT: 2825 mL / NET: -2375 mL        PHYSICAL EXAM:  	General: patient is awake and al   	Head: NC/AT  	ENT: ET tube in place, no nasal discharge; uvula midline, no oropharyngeal erythema or exudates; MMM  	Lungs: good air movement upper lung fields, +bibasilar rales, synchornous breathing with ventilator    	Cardio: bradycardic, +S1S2,   	GI: distended, +BSx4, unable to assess tenderness  	Extremities: trace upper extremity peripheral edema, no lower peripheral edema, no cyanosis, no clubbing   	Vascular: weak radial and femoral pulses   	Dermatologic: skin warm, dry and intact; no rashes, wounds, or scars  	Lymphatic: no submandibular or cervical LAD    Neurologic: AAOx0      MEDICATIONS  (STANDING):  ALBUTerol/ipratropium for Nebulization 3 milliLiter(s) Nebulizer every 4 hours  artificial  tears Solution 1 Drop(s) Both EYES four times a day  atorvastatin 20 milliGRAM(s) Oral at bedtime  cefTRIAXone   IVPB 1 Gram(s) IV Intermittent every 24 hours  dextrose 5%. 1000 milliLiter(s) (50 mL/Hr) IV Continuous <Continuous>  dextrose 50% Injectable 12.5 Gram(s) IV Push once  dextrose 50% Injectable 25 Gram(s) IV Push once  dextrose 50% Injectable 25 Gram(s) IV Push once  doxycycline IVPB      doxycycline IVPB 100 milliGRAM(s) IV Intermittent every 12 hours  heparin  Injectable 5000 Unit(s) SubCutaneous every 8 hours  insulin lispro (HumaLOG) corrective regimen sliding scale   SubCutaneous Before meals and at bedtime  pantoprazole  Injectable 40 milliGRAM(s) IV Push daily    MEDICATIONS  (PRN):  dextrose Gel 1 Dose(s) Oral once PRN Blood Glucose LESS THAN 70 milliGRAM(s)/deciliter  glucagon  Injectable 1 milliGRAM(s) IntraMuscular once PRN Glucose LESS THAN 70 milligrams/deciliter      Allergies    No Known Allergies    Intolerances        LABS:                        14.5   11.6  )-----------( 173      ( 05 Oct 2017 09:01 )             41.7     10-05    140  |  100  |  28<H>  ----------------------------<  207<H>  4.8   |  26  |  1.70<H>    Ca    9.3      05 Oct 2017 09:01  Phos  4.2     10-04  Mg     2.0     10-04    TPro  5.9<L>  /  Alb  3.2<L>  /  TBili  0.4  /  DBili  x   /  AST  45<H>  /  ALT  76<H>  /  AlkPhos  71  10-04    PT/INR - ( 03 Oct 2017 20:58 )   PT: 11.0 sec;   INR: 0.99          PTT - ( 03 Oct 2017 20:58 )  PTT:25.7 sec  Urinalysis Basic - ( 03 Oct 2017 21:54 )    Color: Yellow / Appearance: Clear / S.020 / pH: x  Gluc: x / Ketone: NEGATIVE  / Bili: Negative / Urobili: 0.2 E.U./dL   Blood: x / Protein: NEGATIVE mg/dL / Nitrite: NEGATIVE   Leuk Esterase: NEGATIVE / RBC: x / WBC x   Sq Epi: x / Non Sq Epi: x / Bacteria: x        RADIOLOGY & ADDITIONAL TESTS:      Assessment:     Plan: [Systems Based] INTERVAL HPI/OVERNIGHT EVENTS:  No acute events overnight.   This morning, patient was alert, awake and requesting ET tube be removed.   After a successful CPAP trial, patient was extubated and was doing well on NC.     Dunn removed as patient has had good urine output.     VITAL SIGNS:  T(F): 98.3 (10-05-17 @ 13:39)  HR: 106 (10-05-17 @ 15:00)  BP: 164/95 (10-05-17 @ 15:00)  RR: 14 (10-05-17 @ 15:00)  SpO2: 94% (10-05-17 @ 15:00)  Wt(kg): --    Input & Output:    10-04-17 @ 07:01  -  10-05-17 @ 07:00  --------------------------------------------------------  IN: 2596.7 mL / OUT: 2925 mL / NET: -328.3 mL    10-05-17 @ 07:01  -  10-05-17 @ 15:55  --------------------------------------------------------  IN: 450 mL / OUT: 2825 mL / NET: -2375 mL    PHYSICAL EXAM:  	General: patient is awake and alert, saturating well on NC.    	Head: NC/AT  	ENT: no nasal discharge; uvula midline, no oropharyngeal erythema or exudates; MMM  	Lungs: good air movement upper lung fields, +bibasilar rales  	Cardio: +S1S2, no murmurs  	GI: distended and tympanic, hypoactive BS  	Extremities: L UE swollen, likely due to infiltration of IV  	Vascular: weak radial and femoral pulses   	Dermatologic: skin warm, dry and intact; no rashes, wounds, or scars  	Lymphatic: no submandibular or cervical LAD    Neurologic: Alert and Oriented to name, knows he is in hospital.       MEDICATIONS  (STANDING):  ALBUTerol/ipratropium for Nebulization 3 milliLiter(s) Nebulizer every 4 hours  artificial  tears Solution 1 Drop(s) Both EYES four times a day  atorvastatin 20 milliGRAM(s) Oral at bedtime  cefTRIAXone   IVPB 1 Gram(s) IV Intermittent every 24 hours  dextrose 5%. 1000 milliLiter(s) (50 mL/Hr) IV Continuous <Continuous>  dextrose 50% Injectable 12.5 Gram(s) IV Push once  dextrose 50% Injectable 25 Gram(s) IV Push once  dextrose 50% Injectable 25 Gram(s) IV Push once  doxycycline IVPB      doxycycline IVPB 100 milliGRAM(s) IV Intermittent every 12 hours  heparin  Injectable 5000 Unit(s) SubCutaneous every 8 hours  insulin lispro (HumaLOG) corrective regimen sliding scale   SubCutaneous Before meals and at bedtime  pantoprazole  Injectable 40 milliGRAM(s) IV Push daily    Allergies  No Known Allergies  Intolerances    LABS:                        14.5   11.6  )-----------( 173      ( 05 Oct 2017 09:01 )             41.7     10-05    140  |  100  |  28<H>  ----------------------------<  207<H>  4.8   |  26  |  1.70<H>    Ca    9.3      05 Oct 2017 09:01  Phos  4.2     10-04  Mg     2.0     10-04    TPro  5.9<L>  /  Alb  3.2<L>  /  TBili  0.4  /  DBili  x   /  AST  45<H>  /  ALT  76<H>  /  AlkPhos  71  10-04    PT/INR - ( 03 Oct 2017 20:58 )   PT: 11.0 sec;   INR: 0.99          PTT - ( 03 Oct 2017 20:58 )  PTT:25.7 sec  Urinalysis Basic - ( 03 Oct 2017 21:54 )    Color: Yellow / Appearance: Clear / S.020 / pH: x  Gluc: x / Ketone: NEGATIVE  / Bili: Negative / Urobili: 0.2 E.U./dL   Blood: x / Protein: NEGATIVE mg/dL / Nitrite: NEGATIVE   Leuk Esterase: NEGATIVE / RBC: x / WBC x   Sq Epi: x / Non Sq Epi: x / Bacteria: x        RADIOLOGY & ADDITIONAL TESTS:      Assessment:     Plan: [Systems Based] MICU Intern Transfer Note   [facility] to [facility]    Hospital Course:   Patient is a 72 year old French male with a history of CAD, Chronic pEFHF (10/4 normal ECHO, EF 55%, pacemaker in situ), hypertension, and diabetes mellitus who presented with abdominal pain, nausea, and dizziness.         INTERVAL HPI/OVERNIGHT EVENTS:  No acute events overnight.   This morning, patient was alert, awake and requesting ET tube be removed.   After a successful CPAP trial, patient was extubated and was doing well on NC.     Dunn removed as patient has had good urine output.     VITAL SIGNS:  T(F): 98.3 (10-05-17 @ 13:39)  HR: 106 (10-05-17 @ 15:00)  BP: 164/95 (10-05-17 @ 15:00)  RR: 14 (10-05-17 @ 15:00)  SpO2: 94% (10-05-17 @ 15:00)  Wt(kg): --    Input & Output:    10-04-17 @ 07:01  -  10-05-17 @ 07:00  --------------------------------------------------------  IN: 2596.7 mL / OUT: 2925 mL / NET: -328.3 mL    10-05-17 @ 07:01  -  10-05-17 @ 15:55  --------------------------------------------------------  IN: 450 mL / OUT: 2825 mL / NET: -2375 mL    PHYSICAL EXAM:  	General: patient is awake and alert, saturating well on NC.    	Head: NC/AT  	ENT: no nasal discharge; uvula midline, no oropharyngeal erythema or exudates; MMM  	Lungs: good air movement upper lung fields, +bibasilar rales  	Cardio: +S1S2, no murmurs  	GI: distended and tympanic, hypoactive BS  	Extremities: L UE swollen, likely due to infiltration of IV  	Vascular: weak radial and femoral pulses   	Dermatologic: skin warm, dry and intact; no rashes, wounds, or scars  	Lymphatic: no submandibular or cervical LAD    Neurologic: Alert and Oriented to name, knows he is in hospital.       MEDICATIONS  (STANDING):  ALBUTerol/ipratropium for Nebulization 3 milliLiter(s) Nebulizer every 4 hours  artificial  tears Solution 1 Drop(s) Both EYES four times a day  atorvastatin 20 milliGRAM(s) Oral at bedtime  cefTRIAXone   IVPB 1 Gram(s) IV Intermittent every 24 hours  dextrose 5%. 1000 milliLiter(s) (50 mL/Hr) IV Continuous <Continuous>  dextrose 50% Injectable 12.5 Gram(s) IV Push once  dextrose 50% Injectable 25 Gram(s) IV Push once  dextrose 50% Injectable 25 Gram(s) IV Push once  doxycycline IVPB      doxycycline IVPB 100 milliGRAM(s) IV Intermittent every 12 hours  heparin  Injectable 5000 Unit(s) SubCutaneous every 8 hours  insulin lispro (HumaLOG) corrective regimen sliding scale   SubCutaneous Before meals and at bedtime  pantoprazole  Injectable 40 milliGRAM(s) IV Push daily    Allergies  No Known Allergies  Intolerances    LABS:                        14.5   11.6  )-----------( 173      ( 05 Oct 2017 09:01 )             41.7     10-05    140  |  100  |  28<H>  ----------------------------<  207<H>  4.8   |  26  |  1.70<H>    Ca    9.3      05 Oct 2017 09:01  Phos  4.2     10-04  Mg     2.0     10-04    TPro  5.9<L>  /  Alb  3.2<L>  /  TBili  0.4  /  DBili  x   /  AST  45<H>  /  ALT  76<H>  /  AlkPhos  71  10-04    PT/INR - ( 03 Oct 2017 20:58 )   PT: 11.0 sec;   INR: 0.99          PTT - ( 03 Oct 2017 20:58 )  PTT:25.7 sec  Urinalysis Basic - ( 03 Oct 2017 21:54 )    Color: Yellow / Appearance: Clear / S.020 / pH: x  Gluc: x / Ketone: NEGATIVE  / Bili: Negative / Urobili: 0.2 E.U./dL   Blood: x / Protein: NEGATIVE mg/dL / Nitrite: NEGATIVE   Leuk Esterase: NEGATIVE / RBC: x / WBC x   Sq Epi: x / Non Sq Epi: x / Bacteria: x        RADIOLOGY & ADDITIONAL TESTS:      Assessment:     Plan: [Systems Based] MICU Intern Transfer Note   [facility] to [facility]    Hospital Course:   Patient is a 72 year old [place]at[place] male (limited english) with a history of CAD, Chronic pEFHF (10/4 normal ECHO, EF 55%, pacemaker in situ), hypertension, and diabetes mellitus who presented with abdominal pain, nausea, and dizziness. In the ED, labs were significant for elevated creatinine of 3.0 and hyperkalemia of 8.1 and with non-anion gap metabolic acidosis. On Abdominal/Pelvic CT - patient found to have a R atrophic kidney and L cystic Renal Mass (patient aware of finding). Patient was treated with Kayexalate, calcium gluconate, insulin + dextrose. Around midnight - patient began to complain of CP + SOB + diaphoretic + plethoric, and became hypoxic to the 80s on non-rebreather and decision was made to intubate patient, maintaining him on a propofol drip. Patient also had episodes of bradycardia in the 50's and 60's. Given that his baseline rate was unknown, concern was present for symptomatic bradycardia. EP interrogated pacemaker, which was placed in  in [place], and found it to be in good working condition. They changed the baseline rate from 45 t6o 50bpm. On X-ray, area of R focal lower lobe consolidation visualized, concerning for likely CAP for which patient will be treated with Ceftriaxone and Doxycycline (on EKG, pt had prolonged QTc - can not administer Azithromycin), which was started 10/4. With IVF along with treatment in ED, patient's kidney function seemed to return to baseline with downtrending Cr and K+. It is likely patient has an element of chronic CKD in the setting of Diabetes and HTN. Blood pressure began to creep up to sysBP 170 - have started him on Norvasc 10mg. Other home meds include: Spironolactone, Indapamide, and Clonidine. Please consider other agents as these are not first line. Per Renal recs, patient underwent U/S to further evaluate the nature of the renal mass. Results still pending, to be followed up. Patient was extubated today and is saturating well after transition to NC. He tolerated ambulation and passed bedside dysphagia screen, after which, a diet was commenced.     He is hemodynamically stable and amenable to treatment on the [facility].            INTERVAL HPI/OVERNIGHT EVENTS:  No acute events overnight.   This morning, patient was alert, awake and requesting ET tube be removed.   After a successful CPAP trial, patient was extubated and was doing well on NC.     Dunn removed as patient has had good urine output.     VITAL SIGNS:  T(F): 98.3 (10-05-17 @ 13:39)  HR: 106 (10-05-17 @ 15:00)  BP: 164/95 (10-05-17 @ 15:00)  RR: 14 (10-05-17 @ 15:00)  SpO2: 94% (10-05-17 @ 15:00)  Wt(kg): --    Input & Output:    10-04-17 @ 07:01  -  10-05-17 @ 07:00  --------------------------------------------------------  IN: 2596.7 mL / OUT: 2925 mL / NET: -328.3 mL    10-05-17 @ 07:01  -  10-05-17 @ 15:55  --------------------------------------------------------  IN: 450 mL / OUT: 2825 mL / NET: -2375 mL    PHYSICAL EXAM:  	General: patient is awake and alert, saturating well on NC.    	Head: NC/AT  	ENT: no nasal discharge; uvula midline, no oropharyngeal erythema or exudates; MMM  	Lungs: good air movement upper lung fields, +bibasilar rales  	Cardio: +S1S2, no murmurs  	GI: distended and tympanic, hypoactive BS  	Extremities: L UE swollen, likely due to infiltration of IV  	Vascular: weak radial and femoral pulses   	Dermatologic: skin warm, dry and intact; no rashes, wounds, or scars  	Lymphatic: no submandibular or cervical LAD    Neurologic: Alert and Oriented to name, knows he is in hospital.       MEDICATIONS  (STANDING):  ALBUTerol/ipratropium for Nebulization 3 milliLiter(s) Nebulizer every 4 hours  artificial  tears Solution 1 Drop(s) Both EYES four times a day  atorvastatin 20 milliGRAM(s) Oral at bedtime  cefTRIAXone   IVPB 1 Gram(s) IV Intermittent every 24 hours  dextrose 5%. 1000 milliLiter(s) (50 mL/Hr) IV Continuous <Continuous>  dextrose 50% Injectable 12.5 Gram(s) IV Push once  dextrose 50% Injectable 25 Gram(s) IV Push once  dextrose 50% Injectable 25 Gram(s) IV Push once  doxycycline IVPB      doxycycline IVPB 100 milliGRAM(s) IV Intermittent every 12 hours  heparin  Injectable 5000 Unit(s) SubCutaneous every 8 hours  insulin lispro (HumaLOG) corrective regimen sliding scale   SubCutaneous Before meals and at bedtime  pantoprazole  Injectable 40 milliGRAM(s) IV Push daily    Allergies  No Known Allergies  Intolerances    LABS:                        14.5   11.6  )-----------( 173      ( 05 Oct 2017 09:01 )             41.7     10-05    140  |  100  |  28<H>  ----------------------------<  207<H>  4.8   |  26  |  1.70<H>    Ca    9.3      05 Oct 2017 09:01  Phos  4.2     10-04  Mg     2.0     10-04    TPro  5.9<L>  /  Alb  3.2<L>  /  TBili  0.4  /  DBili  x   /  AST  45<H>  /  ALT  76<H>  /  AlkPhos  71  10-04    PT/INR - ( 03 Oct 2017 20:58 )   PT: 11.0 sec;   INR: 0.99          PTT - ( 03 Oct 2017 20:58 )  PTT:25.7 sec  Urinalysis Basic - ( 03 Oct 2017 21:54 )    Color: Yellow / Appearance: Clear / S.020 / pH: x  Gluc: x / Ketone: NEGATIVE  / Bili: Negative / Urobili: 0.2 E.U./dL   Blood: x / Protein: NEGATIVE mg/dL / Nitrite: NEGATIVE   Leuk Esterase: NEGATIVE / RBC: x / WBC x   Sq Epi: x / Non Sq Epi: x / Bacteria: x        RADIOLOGY & ADDITIONAL TESTS:      Assessment:     Plan: [Systems Based]

## 2017-10-05 NOTE — PROGRESS NOTE ADULT - SUBJECTIVE AND OBJECTIVE BOX
Objective and subjective   The patient is extubated this morning. Awake and follows commands     He was transferred last night from outside facility where he went because of generalized malaise. Found to be hyperkalemic with deterioration of the renal function. ECG changes - bradycardia, AV block first degree and T wave> treated conservatively overnight and this morning. Intubated for acute hypoxemic respiratory failure contributed to ARDS possibly due to pneumonia. The renal service is following the case for hyperkalemia and renal failure         ALBUTerol/ipratropium for Nebulization 3 milliLiter(s) every 4 hours  amLODIPine   Tablet 5 milliGRAM(s) daily  artificial  tears Solution 1 Drop(s) four times a day  cefTRIAXone   IVPB 1 Gram(s) every 24 hours  dextrose 5%. 1000 milliLiter(s) <Continuous>  dextrose 50% Injectable 12.5 Gram(s) once  dextrose 50% Injectable 25 Gram(s) once  dextrose 50% Injectable 25 Gram(s) once  dextrose Gel 1 Dose(s) once PRN  doxycycline IVPB     doxycycline IVPB 100 milliGRAM(s) every 12 hours  glucagon  Injectable 1 milliGRAM(s) once PRN  heparin  Injectable 5000 Unit(s) every 8 hours  insulin lispro (HumaLOG) corrective regimen sliding scale   Before meals and at bedtime  pantoprazole  Injectable 40 milliGRAM(s) daily      Allergies    No Known Allergies    Intolerances        T(C): , Max: 38.2 (10-04-17 @ 18:51)  T(F): , Max: 100.8 (10-04-17 @ 18:51)  HR: 78 (10-05-17 @ 12:00)  BP: 173/94 (10-05-17 @ 12:00)  BP(mean): 126 (10-05-17 @ 12:00)  RR: 14 (10-05-17 @ 12:00)  SpO2: 95% (10-05-17 @ 12:00)  Wt(kg): --    10-04 @ 07:01  -  10-05 @ 07:00  --------------------------------------------------------  IN:    dextrose 5%: 150 mL    norepinephrine Infusion: 28 mL    propofol Infusion: 468.7 mL    sodium chloride 0.9%: 1600 mL    sodium chloride 0.9%: 100 mL    Solution: 250 mL  Total IN: 2596.7 mL    OUT:    Indwelling Catheter - Urethral: 2925 mL  Total OUT: 2925 mL    Total NET: -328.3 mL      10-05 @ 07:01  -  10-05 @ 13:48  --------------------------------------------------------  IN:    sodium chloride 0.9%: 100 mL    Solution: 150 mL  Total IN: 250 mL    OUT:    Indwelling Catheter - Urethral: 1325 mL    Nasoenteral Tube: 1250 mL  Total OUT: 2575 mL    Total NET: -2325 mL      physical exam:   Alert and oriented   Normal air entry in the lungs, no wheezing, no crackles, no rales   RRR, normal s1/s2, no murmurs, rubs or gallops   Abdomen - soft, non-tender,non-distended, normal bowel sounds   Extremities: mild peripheral edema   haS  a morales catheter with good urine output           LABS:                        14.5   11.6  )-----------( 173      ( 05 Oct 2017 09:01 )             41.7     10-05    140  |  100  |  28<H>  ----------------------------<  207<H>  4.8   |  26  |  1.70<H>    Ca    9.3      05 Oct 2017 09:01  Phos  4.2     10-04  Mg     2.0     10-04    TPro  5.9<L>  /  Alb  3.2<L>  /  TBili  0.4  /  DBili  x   /  AST  45<H>  /  ALT  76<H>  /  AlkPhos  71  10-04      PT/INR - ( 03 Oct 2017 20:58 )   PT: 11.0 sec;   INR: 0.99          PTT - ( 03 Oct 2017 20:58 )  PTT:25.7 sec  Urinalysis Basic - ( 03 Oct 2017 21:54 )    Color: Yellow / Appearance: Clear / S.020 / pH: x  Gluc: x / Ketone: NEGATIVE  / Bili: Negative / Urobili: 0.2 E.U./dL   Blood: x / Protein: NEGATIVE mg/dL / Nitrite: NEGATIVE   Leuk Esterase: NEGATIVE / RBC: x / WBC x   Sq Epi: x / Non Sq Epi: x / Bacteria: x      Sodium, Random Urine: 60 mmol/L (10-03 @ 21:54)  Creatinine, Random Urine: 82 mg/dL (10-03 @ 21:54)        RADIOLOGY & ADDITIONAL STUDIES:

## 2017-10-05 NOTE — PROGRESS NOTE ADULT - ASSESSMENT
Patient is a 72 year old male with a history of coronary artery disease, hypertension, and diabetes mellitus whom presented to the hospital with renal failure and hyperkalemia. Patient went into acute hypoxic respiratory failure d/t septic shock and required intubation. Is now doing well on NC, continuing with CAP treatment.      Respiratory:   CAP  -Patient initially intubated for Acute RF in the setting of septic shock 2/2 to PNA  - Will c/w Ceftriaxone and Doxy     Cardio:   #chronic CHF with defibrillator   - Normal ECHO yday with normal EF and no valvular issues   - holding home medications spironolactone 50mg, indapamide 1.5mg   - EP Consult: 3 episodes SVT that spontaneously resolved Sep 15. Baseline increased from 45 bpm to 50 bpm.   - Presentation unlikely cardiac in nature     #HTN   - C/w Norvasc 10mg     #HLD  - Atorvastatin 20mg     GI:   #Abdominal pain   -CT showing fluid around the gallbladder  -f/u gallbladder sonogram   - Abdomen distended and tympanic, but X-ray normal. Monitor for BMs    Endo:   #Diabetes:   -on home gliclazide 60mg   -f/u HgA1c   -C/w ISS     Renal:   #Acute renal failure, renal failure with unknown baseline.   - Unclear if patient has a history of CKD or if this is an acute insult given language barrier   - f/u UA, urine lytes, urine protein/creatinine ratio   - Evidence on CT of renal cyst/calcified mass on L kidney   - Renal rec: retroperitoneal ultrasound to rule out obstruction, evaluate the size and echogenicity of the kidneys to evaluate chronicity of the renal failure   - Please avoid nephrotoxic medications, IV contrast, and NSAIDs    #Resolved Hyperkalemia  -Patient's hyperkalemia is likely related to renal failure along with the use of ARBs  -Patient is responding to medical therapy and potassium has improved from 8.1 to 4.8  -s/p medical management with Kayexalate / insulin + dextrose/calcium gluconate       F: None   E: Replete PRN  N: Consistent Carb Diet   Prophylaxis: HSQ 72M with CAD, hypertension, DM presented to the hospital with renal failure and hyperkalemia. Patient went into acute hypoxic respiratory failure d/t septic shock and required intubation. Is now doing well on NC, continuing with CAP treatment.      Respiratory:   CAP  -Patient initially intubated for Acute RF in the setting of septic shock 2/2 to PNA  - Will c/w Ceftriaxone and Doxy     Cardio:   #chronic CHF with defibrillator   - Normal ECHO yday with normal EF and no valvular issues   - holding home medications spironolactone 50mg, indapamide 1.5mg   - EP Consult: 3 episodes SVT that spontaneously resolved Sep 15. Baseline increased from 45 bpm to 50 bpm.   - Presentation unlikely cardiac in nature     #HTN   - C/w Norvasc 10mg     #HLD  - Atorvastatin 20mg     GI:   #Abdominal pain   -CT showing fluid around the gallbladder  -f/u gallbladder sonogram   - Abdomen distended and tympanic, but X-ray normal. Monitor for BMs    Endo:   #Diabetes:   -on home gliclazide 60mg   -f/u HgA1c   -C/w ISS     Renal:   #Acute renal failure, renal failure with unknown baseline.   - Unclear if patient has a history of CKD or if this is an acute insult given language barrier   - f/u UA, urine lytes, urine protein/creatinine ratio   - Evidence on CT of renal cyst/calcified mass on L kidney   - Renal rec: retroperitoneal ultrasound to rule out obstruction, evaluate the size and echogenicity of the kidneys to evaluate chronicity of the renal failure   - Please avoid nephrotoxic medications, IV contrast, and NSAIDs    #Resolved Hyperkalemia  -Patient's hyperkalemia is likely related to renal failure along with the use of ARBs  -Patient is responding to medical therapy and potassium has improved from 8.1 to 4.8  -s/p medical management with Kayexalate / insulin + dextrose/calcium gluconate       F: None   E: Replete PRN  N: Consistent Carb Diet   Prophylaxis: HSQ 72M with CAD, hypertension, DM presented to the hospital with renal failure and hyperkalemia. Patient went into acute hypoxic respiratory failure d/t septic shock and required intubation. Is now doing well on NC, continuing with CAP treatment.

## 2017-10-06 VITALS — WEIGHT: 172.18 LBS

## 2017-10-06 DIAGNOSIS — E11.65 TYPE 2 DIABETES MELLITUS WITH HYPERGLYCEMIA: ICD-10-CM

## 2017-10-06 LAB
ANION GAP SERPL CALC-SCNC: 14 MMOL/L — SIGNIFICANT CHANGE UP (ref 5–17)
BASOPHILS NFR BLD AUTO: 0.4 % — SIGNIFICANT CHANGE UP (ref 0–2)
BUN SERPL-MCNC: 22 MG/DL — SIGNIFICANT CHANGE UP (ref 7–23)
CALCIUM SERPL-MCNC: 9.5 MG/DL — SIGNIFICANT CHANGE UP (ref 8.4–10.5)
CHLORIDE SERPL-SCNC: 99 MMOL/L — SIGNIFICANT CHANGE UP (ref 96–108)
CO2 SERPL-SCNC: 27 MMOL/L — SIGNIFICANT CHANGE UP (ref 22–31)
CREAT SERPL-MCNC: 1.61 MG/DL — HIGH (ref 0.5–1.3)
EOSINOPHIL NFR BLD AUTO: 0.9 % — SIGNIFICANT CHANGE UP (ref 0–6)
GLUCOSE SERPL-MCNC: 171 MG/DL — HIGH (ref 70–99)
HCT VFR BLD CALC: 42.9 % — SIGNIFICANT CHANGE UP (ref 39–50)
HGB BLD-MCNC: 14.1 G/DL — SIGNIFICANT CHANGE UP (ref 13–17)
LYMPHOCYTES # BLD AUTO: 16.7 % — SIGNIFICANT CHANGE UP (ref 13–44)
MAGNESIUM SERPL-MCNC: 2 MG/DL — SIGNIFICANT CHANGE UP (ref 1.6–2.6)
MCHC RBC-ENTMCNC: 30.5 PG — SIGNIFICANT CHANGE UP (ref 27–34)
MCHC RBC-ENTMCNC: 32.9 G/DL — SIGNIFICANT CHANGE UP (ref 32–36)
MCV RBC AUTO: 92.9 FL — SIGNIFICANT CHANGE UP (ref 80–100)
MONOCYTES NFR BLD AUTO: 15.2 % — HIGH (ref 2–14)
NEUTROPHILS NFR BLD AUTO: 66.8 % — SIGNIFICANT CHANGE UP (ref 43–77)
PLATELET # BLD AUTO: 146 K/UL — LOW (ref 150–400)
POTASSIUM SERPL-MCNC: 4.2 MMOL/L — SIGNIFICANT CHANGE UP (ref 3.5–5.3)
POTASSIUM SERPL-SCNC: 4.2 MMOL/L — SIGNIFICANT CHANGE UP (ref 3.5–5.3)
RBC # BLD: 4.62 M/UL — SIGNIFICANT CHANGE UP (ref 4.2–5.8)
RBC # FLD: 14.7 % — SIGNIFICANT CHANGE UP (ref 10.3–16.9)
SODIUM SERPL-SCNC: 140 MMOL/L — SIGNIFICANT CHANGE UP (ref 135–145)
WBC # BLD: 10.6 K/UL — HIGH (ref 3.8–10.5)
WBC # FLD AUTO: 10.6 K/UL — HIGH (ref 3.8–10.5)

## 2017-10-06 PROCEDURE — 84300 ASSAY OF URINE SODIUM: CPT

## 2017-10-06 PROCEDURE — 76700 US EXAM ABDOM COMPLETE: CPT

## 2017-10-06 PROCEDURE — 83930 ASSAY OF BLOOD OSMOLALITY: CPT

## 2017-10-06 PROCEDURE — 82436 ASSAY OF URINE CHLORIDE: CPT

## 2017-10-06 PROCEDURE — 82330 ASSAY OF CALCIUM: CPT

## 2017-10-06 PROCEDURE — 96374 THER/PROPH/DIAG INJ IV PUSH: CPT

## 2017-10-06 PROCEDURE — 85027 COMPLETE CBC AUTOMATED: CPT

## 2017-10-06 PROCEDURE — 84540 ASSAY OF URINE/UREA-N: CPT

## 2017-10-06 PROCEDURE — 84295 ASSAY OF SERUM SODIUM: CPT

## 2017-10-06 PROCEDURE — 93005 ELECTROCARDIOGRAM TRACING: CPT

## 2017-10-06 PROCEDURE — 86901 BLOOD TYPING SEROLOGIC RH(D): CPT

## 2017-10-06 PROCEDURE — 99239 HOSP IP/OBS DSCHRG MGMT >30: CPT

## 2017-10-06 PROCEDURE — 74018 RADEX ABDOMEN 1 VIEW: CPT

## 2017-10-06 PROCEDURE — 85025 COMPLETE CBC W/AUTO DIFF WBC: CPT

## 2017-10-06 PROCEDURE — 82803 BLOOD GASES ANY COMBINATION: CPT

## 2017-10-06 PROCEDURE — 82570 ASSAY OF URINE CREATININE: CPT

## 2017-10-06 PROCEDURE — 86900 BLOOD TYPING SEROLOGIC ABO: CPT

## 2017-10-06 PROCEDURE — 85730 THROMBOPLASTIN TIME PARTIAL: CPT

## 2017-10-06 PROCEDURE — 82553 CREATINE MB FRACTION: CPT

## 2017-10-06 PROCEDURE — 83735 ASSAY OF MAGNESIUM: CPT

## 2017-10-06 PROCEDURE — 87184 SC STD DISK METHOD PER PLATE: CPT

## 2017-10-06 PROCEDURE — 81001 URINALYSIS AUTO W/SCOPE: CPT

## 2017-10-06 PROCEDURE — 86850 RBC ANTIBODY SCREEN: CPT

## 2017-10-06 PROCEDURE — 94002 VENT MGMT INPAT INIT DAY: CPT

## 2017-10-06 PROCEDURE — 84156 ASSAY OF PROTEIN URINE: CPT

## 2017-10-06 PROCEDURE — 83690 ASSAY OF LIPASE: CPT

## 2017-10-06 PROCEDURE — 83036 HEMOGLOBIN GLYCOSYLATED A1C: CPT

## 2017-10-06 PROCEDURE — 36415 COLL VENOUS BLD VENIPUNCTURE: CPT

## 2017-10-06 PROCEDURE — 99285 EMERGENCY DEPT VISIT HI MDM: CPT | Mod: 25

## 2017-10-06 PROCEDURE — 71045 X-RAY EXAM CHEST 1 VIEW: CPT

## 2017-10-06 PROCEDURE — 84132 ASSAY OF SERUM POTASSIUM: CPT

## 2017-10-06 PROCEDURE — 93970 EXTREMITY STUDY: CPT

## 2017-10-06 PROCEDURE — 82977 ASSAY OF GGT: CPT

## 2017-10-06 PROCEDURE — 96375 TX/PRO/DX INJ NEW DRUG ADDON: CPT

## 2017-10-06 PROCEDURE — 94640 AIRWAY INHALATION TREATMENT: CPT

## 2017-10-06 PROCEDURE — 83880 ASSAY OF NATRIURETIC PEPTIDE: CPT

## 2017-10-06 PROCEDURE — 80053 COMPREHEN METABOLIC PANEL: CPT

## 2017-10-06 PROCEDURE — 85610 PROTHROMBIN TIME: CPT

## 2017-10-06 PROCEDURE — 74176 CT ABD & PELVIS W/O CONTRAST: CPT

## 2017-10-06 PROCEDURE — 71010: CPT | Mod: 26

## 2017-10-06 PROCEDURE — 82550 ASSAY OF CK (CPK): CPT

## 2017-10-06 PROCEDURE — 71250 CT THORAX DX C-: CPT

## 2017-10-06 PROCEDURE — 81003 URINALYSIS AUTO W/O SCOPE: CPT

## 2017-10-06 PROCEDURE — 84484 ASSAY OF TROPONIN QUANT: CPT

## 2017-10-06 PROCEDURE — 84133 ASSAY OF URINE POTASSIUM: CPT

## 2017-10-06 PROCEDURE — 87070 CULTURE OTHR SPECIMN AEROBIC: CPT

## 2017-10-06 PROCEDURE — 83605 ASSAY OF LACTIC ACID: CPT

## 2017-10-06 PROCEDURE — 84550 ASSAY OF BLOOD/URIC ACID: CPT

## 2017-10-06 PROCEDURE — 84100 ASSAY OF PHOSPHORUS: CPT

## 2017-10-06 PROCEDURE — 93306 TTE W/DOPPLER COMPLETE: CPT

## 2017-10-06 PROCEDURE — 87040 BLOOD CULTURE FOR BACTERIA: CPT

## 2017-10-06 PROCEDURE — 83935 ASSAY OF URINE OSMOLALITY: CPT

## 2017-10-06 PROCEDURE — 80048 BASIC METABOLIC PNL TOTAL CA: CPT

## 2017-10-06 RX ORDER — AMLODIPINE BESYLATE 2.5 MG/1
1 TABLET ORAL
Qty: 30 | Refills: 0 | OUTPATIENT
Start: 2017-10-06 | End: 2017-11-05

## 2017-10-06 RX ORDER — CEFDINIR 250 MG/5ML
1 POWDER, FOR SUSPENSION ORAL
Qty: 4 | Refills: 0 | OUTPATIENT
Start: 2017-10-06 | End: 2017-10-08

## 2017-10-06 RX ORDER — SPIRONOLACTONE 25 MG/1
0 TABLET, FILM COATED ORAL
Qty: 0 | Refills: 0 | COMMUNITY

## 2017-10-06 RX ORDER — INDAPAMIDE 1.25 MG
1 TABLET ORAL
Qty: 0 | Refills: 0 | COMMUNITY

## 2017-10-06 RX ORDER — AMLODIPINE BESYLATE 2.5 MG/1
1 TABLET ORAL
Qty: 0 | Refills: 0 | COMMUNITY

## 2017-10-06 RX ADMIN — PANTOPRAZOLE SODIUM 40 MILLIGRAM(S): 20 TABLET, DELAYED RELEASE ORAL at 11:39

## 2017-10-06 RX ADMIN — Medication 2: at 09:09

## 2017-10-06 RX ADMIN — Medication 1 DROP(S): at 11:40

## 2017-10-06 RX ADMIN — Medication 3 MILLILITER(S): at 11:35

## 2017-10-06 RX ADMIN — Medication 3 MILLILITER(S): at 06:11

## 2017-10-06 RX ADMIN — Medication 2: at 12:19

## 2017-10-06 RX ADMIN — Medication 110 MILLIGRAM(S): at 11:39

## 2017-10-06 RX ADMIN — HEPARIN SODIUM 5000 UNIT(S): 5000 INJECTION INTRAVENOUS; SUBCUTANEOUS at 06:11

## 2017-10-06 RX ADMIN — Medication 1 DROP(S): at 06:11

## 2017-10-06 RX ADMIN — CEFTRIAXONE 100 GRAM(S): 500 INJECTION, POWDER, FOR SOLUTION INTRAMUSCULAR; INTRAVENOUS at 12:19

## 2017-10-06 NOTE — DISCHARGE NOTE ADULT - MEDICATION SUMMARY - MEDICATIONS TO TAKE
I will START or STAY ON the medications listed below when I get home from the hospital:    gliclazide 60mg  -- Indication: For DIabetes    betamethasone 0.6 mg oral tablet  -- Indication: For Prophylactic measure    dexchlorpheniramine 2 mg oral tablet  -- Indication: For Prophylactic measure    atorvastatin 20 mg oral tablet  -- 1 tab(s) by mouth once a day  -- Indication: For CAD (coronary artery disease)    doxycycline hyclate 100 mg oral capsule  -- 1 cap(s) by mouth 2 times a day   -- Avoid prolonged or excessive exposure to direct and/or artificial sunlight while taking this medication.  Do not take this drug if you are pregnant.  Finish all this medication unless otherwise directed by prescriber.  Medication should be taken with plenty of water.    -- Indication: For Pneumonia    amLODIPine 10 mg oral tablet  -- 1 tab(s) by mouth once a day  -- Indication: For Hypertension    cefdinir 300 mg oral capsule  -- 1 cap(s) by mouth 2 times a day   -- Finish all this medication unless otherwise directed by prescriber.    -- Indication: For Pneumonia

## 2017-10-06 NOTE — PROGRESS NOTE ADULT - SUBJECTIVE AND OBJECTIVE BOX
Objective and subjective   Yesterday morning extubated and transferred to the medical floor. Feels beteter eating his breakfast       He was transferred last night from outside facility where he went because of generalized malaise. Found to be hyperkalemic with deterioration of the renal function. ECG changes - bradycardia, AV block first degree and T wave> treated conservatively overnight and this morning. Intubated for acute hypoxemic respiratory failure contributed to ARDS possibly due to pneumonia. The renal service is following the case for hyperkalemia and renal failure     Patient seen and examined at bedside.     ALBUTerol/ipratropium for Nebulization 3 milliLiter(s) every 4 hours  amLODIPine   Tablet 10 milliGRAM(s) daily  artificial  tears Solution 1 Drop(s) four times a day  atorvastatin 20 milliGRAM(s) at bedtime  cefTRIAXone   IVPB 1 Gram(s) every 24 hours  dextrose 5%. 1000 milliLiter(s) <Continuous>  dextrose 50% Injectable 12.5 Gram(s) once  dextrose 50% Injectable 25 Gram(s) once  dextrose 50% Injectable 25 Gram(s) once  dextrose Gel 1 Dose(s) once PRN  doxycycline IVPB     doxycycline IVPB 100 milliGRAM(s) every 12 hours  glucagon  Injectable 1 milliGRAM(s) once PRN  heparin  Injectable 5000 Unit(s) every 8 hours  insulin lispro (HumaLOG) corrective regimen sliding scale   Before meals and at bedtime      Allergies    No Known Allergies    Intolerances        T(C): , Max: 38.3 (10-05-17 @ 19:07)  T(F): , Max: 100.9 (10-05-17 @ 19:07)  HR: 85 (10-06-17 @ 09:00)  BP: 117/82 (10-06-17 @ 09:00)  BP(mean): 113 (10-05-17 @ 19:00)  RR: 18 (10-06-17 @ 09:00)  SpO2: 95% (10-06-17 @ 09:00)  Wt(kg): --    10-05 @ 07:01  -  10-06 @ 07:00  --------------------------------------------------------  IN:    Oral Fluid: 200 mL    sodium chloride 0.9%: 100 mL    Solution: 150 mL  Total IN: 450 mL    OUT:    Indwelling Catheter - Urethral: 1325 mL    Nasoenteral Tube: 1250 mL    Voided: 250 mL  Total OUT: 2825 mL    Total NET: -2375 mL          physical exam:   Alert and oriented   Normal air entry in the lungs, no wheezing, no crackles, no rales   RRR, normal s1/s2, no murmurs, rubs or gallops   Abdomen - soft, non-tender,non-distended, normal bowel sounds   Extremities: mild peripheral edema           LABS:                        14.1   10.6  )-----------( 146      ( 06 Oct 2017 06:32 )             42.9     10-06    140  |  99  |  22  ----------------------------<  171<H>  4.2   |  27  |  1.61<H>    Ca    9.5      06 Oct 2017 06:29  Mg     2.0     10-06                  RADIOLOGY & ADDITIONAL STUDIES:          < from: CT Chest No Cont (10.04.17 @ 03:36) >  MPRESSION:    Large consolidations symmetrically distributed inthe dependent aspect of   upper and lower lobes bilaterally. Favor atelectasis but cannot exclude   aspiration. Patent airways.    Small pleural effusions and trace ascites.    No bowel obstruction.    6 cm left upper lobe nodular density. Recommend follow-up CT of chest   in12 months.      < end of copied text >

## 2017-10-06 NOTE — PROGRESS NOTE ADULT - PROBLEM SELECTOR PLAN 1
- not clear the baseline renal function ( the patient from [facility] it will be difficult to obtain any information in this regard  - possibly underlying diabetic kidney disease  - PCR - 0.2   - renal function improving 1.61  - potasium - 4.2  - sodium - 140  - bicarbonate - 27   - meatbolic acidosis improved  - please consider  evaluation of the left kidney cyst  - renal diet   - in and outs   - daily weights   - avoid nephrotoxins

## 2017-10-06 NOTE — DISCHARGE NOTE ADULT - CARE PLAN
Principal Discharge DX:	Severe sepsis  Secondary Diagnosis:	Acute renal failure, unspecified acute renal failure type  Secondary Diagnosis:	CAP (community acquired pneumonia)  Secondary Diagnosis:	CHF (congestive heart failure)  Secondary Diagnosis:	CAD (coronary artery disease)  Secondary Diagnosis:	Type 2 diabetes mellitus with hyperglycemia, without long-term current use of insulin Principal Discharge DX:	Severe sepsis  Goal:	Resolve infection  Instructions for follow-up, activity and diet:	You came to the hospital because you were feeling short of breath and general weakness. You were found to have a pneumonia on chest x-ray and you also developed chest pain, shortness of breath, and you were not oxygenating well. You had a tube placed in your throat and went to the MICU. Your pacemaker was shown to be working well. You eventually began breathing better and we started you on 2 antibiotics. Please continue taking antibiotics for 3 more days as prescribed. If you feel worsening fever, chills, cough, shortness of breath, weakness, please come back to emergency room.  Secondary Diagnosis:	Acute renal failure, unspecified acute renal failure type  Instructions for follow-up, activity and diet:	You were found to have a very high potassium of 8.1 and a creatinine of 3. After giving intravenous fluids your potassium and creatinine levels decreased to normal levels. A CT scan of your abdomen was performed which showed a large right atrophic kidney and L cystic renal mass  Secondary Diagnosis:	CAP (community acquired pneumonia)  Secondary Diagnosis:	CHF (congestive heart failure)  Secondary Diagnosis:	CAD (coronary artery disease)  Secondary Diagnosis:	Type 2 diabetes mellitus with hyperglycemia, without long-term current use of insulin Principal Discharge DX:	Severe sepsis  Goal:	Resolve infection  Instructions for follow-up, activity and diet:	You came to the hospital because you were feeling short of breath and general weakness. You were found to have a pneumonia on chest x-ray and you also developed chest pain, shortness of breath, and you were not oxygenating well. You had a tube placed in your throat and went to the MICU. Your pacemaker was shown to be working well. You eventually began breathing better and we started you on 2 antibiotics. A 6 cm left upper lobe nodular density was found on CT, please do follow-up CT of chest   in12 months. Please continue taking antibiotics for 3 more days as prescribed. If you feel worsening fever, chills, cough, shortness of breath, weakness, please come back to emergency room.  Secondary Diagnosis:	Acute renal failure, unspecified acute renal failure type  Instructions for follow-up, activity and diet:	You were found to have a very high potassium of 8.1 and a creatinine of 3. After giving intravenous fluids your potassium and creatinine levels decreased to normal levels. A CT scan of your abdomen was performed which showed a large right atrophic kidney and a 8.3 cm calcified cyst on your left kidney. Please follow up with a renal doctor and a urologist.  Secondary Diagnosis:	CAP (community acquired pneumonia)  Instructions for follow-up, activity and diet:	Please continue 2 more days of the cefdinir and doxycycline as prescribed  Secondary Diagnosis:	CHF (congestive heart failure)  Instructions for follow-up, activity and diet:	Please stop taking your spironolactone and your indapamide  Secondary Diagnosis:	CAD (coronary artery disease)  Instructions for follow-up, activity and diet:	Please follow up with your primary care doctor  Secondary Diagnosis:	Type 2 diabetes mellitus with hyperglycemia, without long-term current use of insulin  Instructions for follow-up, activity and diet:	Please continue taking your home gliclazide  Secondary Diagnosis:	Hypertension  Instructions for follow-up, activity and diet:	Please stop your clonidine and take amlodipine 10mg now. Principal Discharge DX:	Acute respiratory failure with hypoxia  Goal:	Resolve infection  Instructions for follow-up, activity and diet:	You came to the hospital because you were feeling short of breath and general weakness. You were found to have a pneumonia on chest x-ray and you also developed chest pain, shortness of breath, and you were not oxygenating well. You had a tube placed in your throat and went to the MICU. Your pacemaker was shown to be working well. You eventually began breathing better and we started you on 2 antibiotics. A 6 cm left upper lobe nodular density was found on CT, please do follow-up CT of chest   in12 months. Please continue taking antibiotics for 3 more days as prescribed. If you feel worsening fever, chills, cough, shortness of breath, weakness, please come back to emergency room.  Secondary Diagnosis:	Acute renal failure, unspecified acute renal failure type  Instructions for follow-up, activity and diet:	You were found to have a very high potassium of 8.1 and a creatinine of 3. After giving intravenous fluids your potassium and creatinine levels decreased to normal levels. A CT scan of your abdomen was performed which showed a large right atrophic kidney and a 8.3 cm calcified cyst on your left kidney. Please follow up with a renal doctor and a urologist.  Secondary Diagnosis:	CAP (community acquired pneumonia)  Instructions for follow-up, activity and diet:	Please continue 2 more days of the cefdinir and doxycycline as prescribed  Secondary Diagnosis:	CHF (congestive heart failure)  Instructions for follow-up, activity and diet:	Please stop taking your spironolactone and your indapamide  Secondary Diagnosis:	CAD (coronary artery disease)  Instructions for follow-up, activity and diet:	Please follow up with your primary care doctor  Secondary Diagnosis:	Type 2 diabetes mellitus with hyperglycemia, without long-term current use of insulin  Instructions for follow-up, activity and diet:	Please continue taking your home gliclazide  Secondary Diagnosis:	Hypertension  Instructions for follow-up, activity and diet:	Please stop your clonidine and take amlodipine 10mg now.

## 2017-10-06 NOTE — PROGRESS NOTE ADULT - PROBLEM SELECTOR PROBLEM 1
Acute renal failure, unspecified acute renal failure type
CAP (community acquired pneumonia)

## 2017-10-06 NOTE — DISCHARGE NOTE ADULT - SECONDARY DIAGNOSIS.
Acute renal failure, unspecified acute renal failure type CAP (community acquired pneumonia) CHF (congestive heart failure) CAD (coronary artery disease) Type 2 diabetes mellitus with hyperglycemia, without long-term current use of insulin Hypertension

## 2017-10-06 NOTE — DISCHARGE NOTE ADULT - MEDICATION SUMMARY - MEDICATIONS TO CHANGE
I will SWITCH the dose or number of times a day I take the medications listed below when I get home from the hospital:    amLODIPine 5 mg oral tablet  -- 1 tab(s) orally

## 2017-10-06 NOTE — PROGRESS NOTE ADULT - PROBLEM SELECTOR PLAN 3
- on the lower side   - can consider reducing the dose of Amlodipine to 5 mg daily
Patient likely to have a component of CKD  - Cr has now trended down 3.05 --> 1.7  - Continues to urinate  - No indication for HD at this time  - Continue to monitor  - f/u UA, urine lytes, urine protein/creatinine ratio   - Evidence on CT of renal cyst/calcified mass on L kidney   - Renal rec: retroperitoneal ultrasound to rule out obstruction, evaluate the size and echogenicity of the kidneys to evaluate chronicity of the renal failure   - avoid nephrotoxic medications, IV contrast, and NSAIDs

## 2017-10-06 NOTE — DISCHARGE NOTE ADULT - HOSPITAL COURSE
72M visiting from [place] with his sister has a history of CAD, Chronic pEFHF (10/4 normal ECHO, EF 55%, pacemaker in situ), HTN, and DM who presented from [facility] on 10/3 with complaint of abdominal pain, nausea, and dizziness. As per patient interview and chart review, patient was at the airport on his way back to [place] when he began to feel unwell and not like himself. He went to the [facility] and was found to have hyperkalemia to 8.1 with non-anion gap metabolic acidosis and elevated Cr to 3.0 (unknown baseline). Patient was treated with Kayexalate, calcium gluconate, insulin and dextrose.    Around midnight - patient began to complain of CP + SOB + diaphoretic + plethoric, and became hypoxic to the 80s on non-rebreather and decision was made to intubate patient, maintaining him on a propofol drip. Patient also had episodes of bradycardia in the 50's and 60's. Given that his baseline rate was unknown, concern was present for symptomatic bradycardia. EP interrogated pacemaker, which was placed in 2009 in [place], and found it to be in good working condition. They changed the baseline rate from 45 t6o 50bpm. On X-ray, area of R focal lower lobe consolidation visualized, concerning for likely CAP for which patient will be treated with Ceftriaxone and Doxycycline (on EKG, pt had prolonged QTc - can not administer Azithromycin), which was started 10/4. With IVF along with treatment in ED, patient's kidney function seemed to return to baseline with downtrending Cr and K+. It is likely patient has an element of chronic CKD in the setting of Diabetes and HTN. BP noted to be increasing to SBP 170s and patient was restarted on home Norvasc 10mg daily. Continue to hold home meds: Spironolactone, Indapamide, and Clonidine. Patient noted to be improving with CT chest and XR chest in the morning. Patient was extubated today and maintaining good saturations on NC. Passed bedside dysphagia, passed TOV, On Abdominal/Pelvic CT - patient found to have a R atrophic kidney and L cystic Renal Mass ____ Patient was clinically stable, laboratory and radiology studies were reviewed, and _he was deemed appropriate for discharge by the internal medicine team.

## 2017-10-06 NOTE — PROGRESS NOTE ADULT - ASSESSMENT
This is 72 year old male with PMH stated above. Now renal service follows the case for renal failure and hyperkalemia. The extubated yesterday and transferred to the medical floor. Treated for pneumonia. Renal function is improving

## 2017-10-06 NOTE — DISCHARGE NOTE ADULT - PATIENT PORTAL LINK FT
“You can access the Follow[facility] Patient Portal, offered by [facility], by registering with the following website: [email or web address]”

## 2017-10-06 NOTE — PROGRESS NOTE ADULT - SUBJECTIVE AND OBJECTIVE BOX
Patient seen and examined. Dunn draining clear urine. Creat improved to 1.7. Start Flomax 0.4 if blood pressure tolerates. If stays over weekend can TOV on monday, otherwise DC home with Dunn and see urology team in clinic on Friday 10/13. Call for appointment [phone] [address] [facility], 3rd floor    ICU Vital Signs Last 24 Hrs  T(C): 37.1 (06 Oct 2017 05:00), Max: 38.3 (05 Oct 2017 19:07)  T(F): 98.8 (06 Oct 2017 05:00), Max: 100.9 (05 Oct 2017 19:07)  HR: 78 (06 Oct 2017 05:00) (71 - 108)  BP: 107/73 (06 Oct 2017 05:00) (107/73 - 198/99)  BP(mean): 113 (05 Oct 2017 19:00) (110 - 156)  ABP: --  ABP(mean): --  RR: 19 (06 Oct 2017 05:00) (10 - 25)  SpO2: 99% (06 Oct 2017 05:00) (94% - 100%)                          14.5   11.6  )-----------( 173      ( 05 Oct 2017 09:01 )             41.7 Patient seen and examined. Dunn removed, patient urinated overnight. Creat improved to 1.7. Start Flomax 0.4 if blood pressure tolerates. Trend post-void residuals and creatinine. If PVR or Creat increasing, consider replacing Dunn. Patient can follow up with urology team in clinic on Friday 10/13. Call for appointment [phone] [address] [facility]), 3rd floor    ICU Vital Signs Last 24 Hrs  T(C): 37.1 (06 Oct 2017 05:00), Max: 38.3 (05 Oct 2017 19:07)  T(F): 98.8 (06 Oct 2017 05:00), Max: 100.9 (05 Oct 2017 19:07)  HR: 78 (06 Oct 2017 05:00) (71 - 108)  BP: 107/73 (06 Oct 2017 05:00) (107/73 - 198/99)  BP(mean): 113 (05 Oct 2017 19:00) (110 - 156)  ABP: --  ABP(mean): --  RR: 19 (06 Oct 2017 05:00) (10 - 25)  SpO2: 99% (06 Oct 2017 05:00) (94% - 100%)                          14.5   11.6  )-----------( 173      ( 05 Oct 2017 09:01 )             41.7

## 2017-10-06 NOTE — DISCHARGE NOTE ADULT - PLAN OF CARE
Resolve infection You came to the hospital because you were feeling short of breath and general weakness. You were found to have a pneumonia on chest x-ray and you also developed chest pain, shortness of breath, and you were not oxygenating well. You had a tube placed in your throat and went to the MICU. Your pacemaker was shown to be working well. You eventually began breathing better and we started you on 2 antibiotics. Please continue taking antibiotics for 3 more days as prescribed. If you feel worsening fever, chills, cough, shortness of breath, weakness, please come back to emergency room. You were found to have a very high potassium of 8.1 and a creatinine of 3. After giving intravenous fluids your potassium and creatinine levels decreased to normal levels. A CT scan of your abdomen was performed which showed a large right atrophic kidney and L cystic renal mass You came to the hospital because you were feeling short of breath and general weakness. You were found to have a pneumonia on chest x-ray and you also developed chest pain, shortness of breath, and you were not oxygenating well. You had a tube placed in your throat and went to the MICU. Your pacemaker was shown to be working well. You eventually began breathing better and we started you on 2 antibiotics. A 6 cm left upper lobe nodular density was found on CT, please do follow-up CT of chest   in12 months. Please continue taking antibiotics for 3 more days as prescribed. If you feel worsening fever, chills, cough, shortness of breath, weakness, please come back to emergency room. You were found to have a very high potassium of 8.1 and a creatinine of 3. After giving intravenous fluids your potassium and creatinine levels decreased to normal levels. A CT scan of your abdomen was performed which showed a large right atrophic kidney and a 8.3 cm calcified cyst on your left kidney. Please follow up with a renal doctor and a urologist. Please continue 2 more days of the cefdinir and doxycycline as prescribed Please stop taking your spironolactone and your indapamide Please follow up with your primary care doctor Please continue taking your home gliclazide Please stop your clonidine and take amlodipine 10mg now.

## 2017-10-06 NOTE — DISCHARGE NOTE ADULT - MEDICATION SUMMARY - MEDICATIONS TO STOP TAKING
I will STOP taking the medications listed below when I get home from the hospital:    indapamide 1.25 mg oral tablet  -- 1 tab(s) orally    spironolactone 50 mg oral tablet    cloNIDine 0.2 mg oral tablet  -- 1 tab(s) orally

## 2017-10-07 LAB
-  CEFTRIAXONE: SIGNIFICANT CHANGE UP
-  CLINDAMYCIN: SIGNIFICANT CHANGE UP
-  ERYTHROMYCIN: SIGNIFICANT CHANGE UP
-  PENICILLIN: SIGNIFICANT CHANGE UP
-  VANCOMYCIN: SIGNIFICANT CHANGE UP
CULTURE RESULTS: SIGNIFICANT CHANGE UP
METHOD TYPE: SIGNIFICANT CHANGE UP
METHOD TYPE: SIGNIFICANT CHANGE UP
ORGANISM # SPEC MICROSCOPIC CNT: SIGNIFICANT CHANGE UP
SPECIMEN SOURCE: SIGNIFICANT CHANGE UP

## 2017-10-09 LAB
CULTURE RESULTS: SIGNIFICANT CHANGE UP
CULTURE RESULTS: SIGNIFICANT CHANGE UP
SPECIMEN SOURCE: SIGNIFICANT CHANGE UP
SPECIMEN SOURCE: SIGNIFICANT CHANGE UP

## 2017-10-18 DIAGNOSIS — A41.9 SEPSIS, UNSPECIFIED ORGANISM: ICD-10-CM

## 2017-10-18 DIAGNOSIS — E87.5 HYPERKALEMIA: ICD-10-CM

## 2017-10-18 DIAGNOSIS — I47.1 SUPRAVENTRICULAR TACHYCARDIA: ICD-10-CM

## 2017-10-18 DIAGNOSIS — N26.1 ATROPHY OF KIDNEY (TERMINAL): ICD-10-CM

## 2017-10-18 DIAGNOSIS — R65.21 SEVERE SEPSIS WITH SEPTIC SHOCK: ICD-10-CM

## 2017-10-18 DIAGNOSIS — J96.01 ACUTE RESPIRATORY FAILURE WITH HYPOXIA: ICD-10-CM

## 2017-10-18 DIAGNOSIS — J18.9 PNEUMONIA, UNSPECIFIED ORGANISM: ICD-10-CM

## 2017-10-18 DIAGNOSIS — N17.9 ACUTE KIDNEY FAILURE, UNSPECIFIED: ICD-10-CM

## 2017-10-18 DIAGNOSIS — I50.32 CHRONIC DIASTOLIC (CONGESTIVE) HEART FAILURE: ICD-10-CM

## 2017-10-18 DIAGNOSIS — E87.1 HYPO-OSMOLALITY AND HYPONATREMIA: ICD-10-CM

## 2017-10-18 DIAGNOSIS — I11.0 HYPERTENSIVE HEART DISEASE WITH HEART FAILURE: ICD-10-CM

## 2017-10-18 DIAGNOSIS — R10.30 LOWER ABDOMINAL PAIN, UNSPECIFIED: ICD-10-CM

## 2017-10-18 DIAGNOSIS — N28.1 CYST OF KIDNEY, ACQUIRED: ICD-10-CM

## 2017-10-18 DIAGNOSIS — E87.2 ACIDOSIS: ICD-10-CM

## 2017-10-18 DIAGNOSIS — I25.10 ATHEROSCLEROTIC HEART DISEASE OF NATIVE CORONARY ARTERY WITHOUT ANGINA PECTORIS: ICD-10-CM

## 2017-10-18 DIAGNOSIS — Z95.810 PRESENCE OF AUTOMATIC (IMPLANTABLE) CARDIAC DEFIBRILLATOR: ICD-10-CM

## 2017-10-18 DIAGNOSIS — E11.65 TYPE 2 DIABETES MELLITUS WITH HYPERGLYCEMIA: ICD-10-CM

## 2017-10-18 DIAGNOSIS — R00.1 BRADYCARDIA, UNSPECIFIED: ICD-10-CM

## 2017-10-18 DIAGNOSIS — J80 ACUTE RESPIRATORY DISTRESS SYNDROME: ICD-10-CM

## 2017-10-18 DIAGNOSIS — E78.5 HYPERLIPIDEMIA, UNSPECIFIED: ICD-10-CM

## 2017-10-18 DIAGNOSIS — I44.0 ATRIOVENTRICULAR BLOCK, FIRST DEGREE: ICD-10-CM

## 2023-01-23 NOTE — PATIENT PROFILE ADULT. - HEALTH/HEALTHCARE ANXIETIES, PROFILE
Remote transmission received from patient's CRT-D home monitor. Transmission shows normal sensing and pacing function. No new arrhythmias/ events recorded. AP 7.4%, CRT 99.9%, Effective 99.9%, VSRp <0.1%    Optivol is within normal range  TriageHF Heart Failure Risk Status on 21-Jan-2023 is Medium    NP will review. See interrogation under cardiology tab in the [address] [place] [address] [address] field for more details. Will continue to monitor remotely. (End of 31-day monitoring period 1/23/23). n/a

## 2024-03-06 NOTE — ED PROVIDER NOTE - NS ED MD DISPO ISOLATION TYPES
Called to schedule an hst per Durga    Left vm for the pt to return my call     [facility] insurance   None